# Patient Record
Sex: MALE | Race: WHITE | NOT HISPANIC OR LATINO | Employment: FULL TIME | URBAN - METROPOLITAN AREA
[De-identification: names, ages, dates, MRNs, and addresses within clinical notes are randomized per-mention and may not be internally consistent; named-entity substitution may affect disease eponyms.]

---

## 2017-06-06 ENCOUNTER — TRANSFERRED RECORDS (OUTPATIENT)
Dept: HEALTH INFORMATION MANAGEMENT | Facility: CLINIC | Age: 32
End: 2017-06-06

## 2017-07-31 ENCOUNTER — APPOINTMENT (OUTPATIENT)
Dept: CT IMAGING | Facility: CLINIC | Age: 32
DRG: 316 | End: 2017-07-31
Attending: EMERGENCY MEDICINE
Payer: COMMERCIAL

## 2017-07-31 ENCOUNTER — TRANSFERRED RECORDS (OUTPATIENT)
Dept: HEALTH INFORMATION MANAGEMENT | Facility: CLINIC | Age: 32
End: 2017-07-31

## 2017-07-31 ENCOUNTER — HOSPITAL ENCOUNTER (INPATIENT)
Facility: CLINIC | Age: 32
LOS: 1 days | Discharge: HOME | DRG: 316 | End: 2017-08-01
Attending: EMERGENCY MEDICINE | Admitting: INTERNAL MEDICINE
Payer: COMMERCIAL

## 2017-07-31 ENCOUNTER — APPOINTMENT (OUTPATIENT)
Dept: CARDIOLOGY | Facility: CLINIC | Age: 32
DRG: 316 | End: 2017-07-31
Attending: EMERGENCY MEDICINE
Payer: COMMERCIAL

## 2017-07-31 DIAGNOSIS — I40.9 ACUTE MYOCARDITIS, UNSPECIFIED MYOCARDITIS TYPE: ICD-10-CM

## 2017-07-31 DIAGNOSIS — R07.9 CHEST PAIN, UNSPECIFIED TYPE: ICD-10-CM

## 2017-07-31 LAB
ANION GAP SERPL CALCULATED.3IONS-SCNC: 7 MMOL/L (ref 3–14)
BASOPHILS # BLD AUTO: 0 10E9/L (ref 0–0.2)
BASOPHILS NFR BLD AUTO: 0.4 %
BUN SERPL-MCNC: 9 MG/DL (ref 7–30)
CALCIUM SERPL-MCNC: 8.2 MG/DL (ref 8.5–10.1)
CHLORIDE SERPL-SCNC: 105 MMOL/L (ref 94–109)
CO2 SERPL-SCNC: 28 MMOL/L (ref 20–32)
CREAT SERPL-MCNC: 0.98 MG/DL (ref 0.66–1.25)
CRP SERPL-MCNC: 73.3 MG/L (ref 0–8)
D DIMER PPP FEU-MCNC: 0.5 UG/ML FEU (ref 0–0.5)
DIFFERENTIAL METHOD BLD: NORMAL
EOSINOPHIL # BLD AUTO: 0.1 10E9/L (ref 0–0.7)
EOSINOPHIL NFR BLD AUTO: 0.5 %
ERYTHROCYTE [DISTWIDTH] IN BLOOD BY AUTOMATED COUNT: 13.1 % (ref 10–15)
GFR SERPL CREATININE-BSD FRML MDRD: 88 ML/MIN/1.7M2
GLUCOSE SERPL-MCNC: 91 MG/DL (ref 70–99)
HCT VFR BLD AUTO: 43.6 % (ref 40–53)
HGB BLD-MCNC: 14.7 G/DL (ref 13.3–17.7)
IMM GRANULOCYTES # BLD: 0 10E9/L (ref 0–0.4)
IMM GRANULOCYTES NFR BLD: 0.4 %
LYMPHOCYTES # BLD AUTO: 2 10E9/L (ref 0.8–5.3)
LYMPHOCYTES NFR BLD AUTO: 21.2 %
MCH RBC QN AUTO: 30.8 PG (ref 26.5–33)
MCHC RBC AUTO-ENTMCNC: 33.7 G/DL (ref 31.5–36.5)
MCV RBC AUTO: 91 FL (ref 78–100)
MONOCYTES # BLD AUTO: 1.1 10E9/L (ref 0–1.3)
MONOCYTES NFR BLD AUTO: 11 %
NEUTROPHILS # BLD AUTO: 6.3 10E9/L (ref 1.6–8.3)
NEUTROPHILS NFR BLD AUTO: 66.5 %
NRBC # BLD AUTO: 0 10*3/UL
NRBC BLD AUTO-RTO: 0 /100
PLATELET # BLD AUTO: 198 10E9/L (ref 150–450)
POTASSIUM SERPL-SCNC: 3.7 MMOL/L (ref 3.4–5.3)
RBC # BLD AUTO: 4.77 10E12/L (ref 4.4–5.9)
SODIUM SERPL-SCNC: 140 MMOL/L (ref 133–144)
TROPONIN I SERPL-MCNC: 4.37 UG/L (ref 0–0.04)
TROPONIN I SERPL-MCNC: 5.49 UG/L (ref 0–0.04)
TROPONIN I SERPL-MCNC: 6.53 UG/L (ref 0–0.04)
WBC # BLD AUTO: 9.5 10E9/L (ref 4–11)

## 2017-07-31 PROCEDURE — 25000128 H RX IP 250 OP 636: Performed by: EMERGENCY MEDICINE

## 2017-07-31 PROCEDURE — 93005 ELECTROCARDIOGRAM TRACING: CPT

## 2017-07-31 PROCEDURE — 99222 1ST HOSP IP/OBS MODERATE 55: CPT | Mod: AI | Performed by: INTERNAL MEDICINE

## 2017-07-31 PROCEDURE — 85379 FIBRIN DEGRADATION QUANT: CPT | Performed by: EMERGENCY MEDICINE

## 2017-07-31 PROCEDURE — 86140 C-REACTIVE PROTEIN: CPT | Performed by: EMERGENCY MEDICINE

## 2017-07-31 PROCEDURE — 99222 1ST HOSP IP/OBS MODERATE 55: CPT | Mod: 25 | Performed by: INTERNAL MEDICINE

## 2017-07-31 PROCEDURE — 71260 CT THORAX DX C+: CPT

## 2017-07-31 PROCEDURE — 84484 ASSAY OF TROPONIN QUANT: CPT | Performed by: EMERGENCY MEDICINE

## 2017-07-31 PROCEDURE — 84484 ASSAY OF TROPONIN QUANT: CPT | Performed by: INTERNAL MEDICINE

## 2017-07-31 PROCEDURE — 93005 ELECTROCARDIOGRAM TRACING: CPT | Mod: 76

## 2017-07-31 PROCEDURE — 36415 COLL VENOUS BLD VENIPUNCTURE: CPT | Performed by: INTERNAL MEDICINE

## 2017-07-31 PROCEDURE — 25000132 ZZH RX MED GY IP 250 OP 250 PS 637: Performed by: INTERNAL MEDICINE

## 2017-07-31 PROCEDURE — 25500064 ZZH RX 255 OP 636: Performed by: EMERGENCY MEDICINE

## 2017-07-31 PROCEDURE — 93306 TTE W/DOPPLER COMPLETE: CPT | Mod: 26 | Performed by: INTERNAL MEDICINE

## 2017-07-31 PROCEDURE — 99285 EMERGENCY DEPT VISIT HI MDM: CPT | Mod: 25

## 2017-07-31 PROCEDURE — 85025 COMPLETE CBC W/AUTO DIFF WBC: CPT | Performed by: EMERGENCY MEDICINE

## 2017-07-31 PROCEDURE — 40000264 ECHO COMPLETE WITH LUMASON

## 2017-07-31 PROCEDURE — 12000007 ZZH R&B INTERMEDIATE

## 2017-07-31 PROCEDURE — 25000132 ZZH RX MED GY IP 250 OP 250 PS 637: Performed by: EMERGENCY MEDICINE

## 2017-07-31 PROCEDURE — 80048 BASIC METABOLIC PNL TOTAL CA: CPT | Performed by: EMERGENCY MEDICINE

## 2017-07-31 RX ORDER — ASPIRIN 325 MG
325 TABLET ORAL ONCE
Status: COMPLETED | OUTPATIENT
Start: 2017-07-31 | End: 2017-07-31

## 2017-07-31 RX ORDER — HYDROMORPHONE HYDROCHLORIDE 1 MG/ML
.3-.5 INJECTION, SOLUTION INTRAMUSCULAR; INTRAVENOUS; SUBCUTANEOUS
Status: DISCONTINUED | OUTPATIENT
Start: 2017-07-31 | End: 2017-08-01 | Stop reason: HOSPADM

## 2017-07-31 RX ORDER — AMOXICILLIN 250 MG
1-2 CAPSULE ORAL 2 TIMES DAILY PRN
Status: DISCONTINUED | OUTPATIENT
Start: 2017-07-31 | End: 2017-08-01 | Stop reason: HOSPADM

## 2017-07-31 RX ORDER — ACETAMINOPHEN 325 MG/1
650 TABLET ORAL EVERY 4 HOURS PRN
Status: DISCONTINUED | OUTPATIENT
Start: 2017-07-31 | End: 2017-08-01 | Stop reason: HOSPADM

## 2017-07-31 RX ORDER — TESTOSTERONE CYPIONATE 200 MG/ML
300 INJECTION, SOLUTION INTRAMUSCULAR
COMMUNITY
End: 2020-06-30

## 2017-07-31 RX ORDER — ASPIRIN 325 MG
650 TABLET ORAL 3 TIMES DAILY
Status: DISCONTINUED | OUTPATIENT
Start: 2017-07-31 | End: 2017-08-01 | Stop reason: HOSPADM

## 2017-07-31 RX ORDER — PROCHLORPERAZINE MALEATE 5 MG
5-10 TABLET ORAL EVERY 6 HOURS PRN
Status: DISCONTINUED | OUTPATIENT
Start: 2017-07-31 | End: 2017-08-01 | Stop reason: HOSPADM

## 2017-07-31 RX ORDER — ONDANSETRON 2 MG/ML
4 INJECTION INTRAMUSCULAR; INTRAVENOUS EVERY 6 HOURS PRN
Status: DISCONTINUED | OUTPATIENT
Start: 2017-07-31 | End: 2017-08-01 | Stop reason: HOSPADM

## 2017-07-31 RX ORDER — POLYETHYLENE GLYCOL 3350 17 G/17G
17 POWDER, FOR SOLUTION ORAL DAILY PRN
Status: DISCONTINUED | OUTPATIENT
Start: 2017-07-31 | End: 2017-08-01 | Stop reason: HOSPADM

## 2017-07-31 RX ORDER — ONDANSETRON 4 MG/1
4 TABLET, ORALLY DISINTEGRATING ORAL EVERY 6 HOURS PRN
Status: DISCONTINUED | OUTPATIENT
Start: 2017-07-31 | End: 2017-08-01 | Stop reason: HOSPADM

## 2017-07-31 RX ORDER — PROCHLORPERAZINE 25 MG
25 SUPPOSITORY, RECTAL RECTAL EVERY 12 HOURS PRN
Status: DISCONTINUED | OUTPATIENT
Start: 2017-07-31 | End: 2017-08-01 | Stop reason: HOSPADM

## 2017-07-31 RX ORDER — NALOXONE HYDROCHLORIDE 0.4 MG/ML
.1-.4 INJECTION, SOLUTION INTRAMUSCULAR; INTRAVENOUS; SUBCUTANEOUS
Status: DISCONTINUED | OUTPATIENT
Start: 2017-07-31 | End: 2017-08-01 | Stop reason: HOSPADM

## 2017-07-31 RX ORDER — OXYCODONE HYDROCHLORIDE 5 MG/1
5-10 TABLET ORAL
Status: DISCONTINUED | OUTPATIENT
Start: 2017-07-31 | End: 2017-08-01 | Stop reason: HOSPADM

## 2017-07-31 RX ORDER — IOPAMIDOL 755 MG/ML
500 INJECTION, SOLUTION INTRAVASCULAR ONCE
Status: COMPLETED | OUTPATIENT
Start: 2017-07-31 | End: 2017-07-31

## 2017-07-31 RX ADMIN — ASPIRIN 325 MG ORAL TABLET 650 MG: 325 PILL ORAL at 21:00

## 2017-07-31 RX ADMIN — RANITIDINE HYDROCHLORIDE 150 MG: 150 TABLET, FILM COATED ORAL at 20:59

## 2017-07-31 RX ADMIN — SULFUR HEXAFLUORIDE 2 ML: KIT at 16:05

## 2017-07-31 RX ADMIN — SODIUM CHLORIDE 90 ML: 9 INJECTION, SOLUTION INTRAVENOUS at 15:17

## 2017-07-31 RX ADMIN — ASPIRIN 325 MG ORAL TABLET 650 MG: 325 PILL ORAL at 17:35

## 2017-07-31 RX ADMIN — ASPIRIN 325 MG ORAL TABLET 325 MG: 325 PILL ORAL at 16:07

## 2017-07-31 RX ADMIN — IOPAMIDOL 79 ML: 755 INJECTION, SOLUTION INTRAVENOUS at 15:16

## 2017-07-31 ASSESSMENT — ACTIVITIES OF DAILY LIVING (ADL)
COGNITION: 0 - NO COGNITION ISSUES REPORTED
DRESS: 0-->INDEPENDENT
RETIRED_COMMUNICATION: 0-->UNDERSTANDS/COMMUNICATES WITHOUT DIFFICULTY
TRANSFERRING: 0-->INDEPENDENT
TOILETING: 0-->INDEPENDENT
BATHING: 0-->INDEPENDENT
FALL_HISTORY_WITHIN_LAST_SIX_MONTHS: NO
RETIRED_EATING: 0-->INDEPENDENT
AMBULATION: 0-->INDEPENDENT
SWALLOWING: 0-->SWALLOWS FOODS/LIQUIDS WITHOUT DIFFICULTY

## 2017-07-31 ASSESSMENT — ENCOUNTER SYMPTOMS
DIAPHORESIS: 1
CHILLS: 1
NAUSEA: 1
PALPITATIONS: 0
VOMITING: 0
LIGHT-HEADEDNESS: 0

## 2017-07-31 NOTE — IP AVS SNAPSHOT
[place] [address] [facility]    [address]    [facility] [address]    Phone:  [phone]    Fax:  [phone]                                       After Visit Summary   7/31/2017    Nick Rose    MRN: [id number]           After Visit Summary Signature Page     I have received my discharge instructions, and my questions have been answered. I have discussed any challenges I see with this plan with the nurse or doctor.    ..........................................................................................................................................  Patient/Patient Representative Signature      ..........................................................................................................................................  Patient Representative Print Name and Relationship to Patient    ..................................................               ................................................  Date                                            Time    ..........................................................................................................................................  Reviewed by Signature/Title    ...................................................              ..............................................  Date                                                            Time

## 2017-07-31 NOTE — PHARMACY-ADMISSION MEDICATION HISTORY
Admission medication history interview status for this patient is complete. See [facility] admission navigator for allergy information, prior to admission medications and immunization status.     Medication history interview source(s):Patient  Medication history resources (including written lists, pill bottles, clinic record):Care-Everywhere    Changes made to PTA medication list:  Added: all    Medication reconciliation/reorder completed by provider prior to medication history? No    For patients on insulin therapy: No      Prior to Admission medications    Medication Sig Last Dose Taking? Auth Provider   testosterone cypionate (DEPOTESTOTERONE) 200 MG/ML injection Inject 300 mg into the muscle Per pt, every 10 days due 8/5/17 Yes Unknown, Entered By History

## 2017-07-31 NOTE — IP AVS SNAPSHOT
MRN:[id number]                      After Visit Summary   7/31/2017    Nick Rose    MRN: [id number]           Thank you!     Thank you for choosing [facility] for your care. Our goal is always to provide you with excellent care. Hearing back from our patients is one way we can continue to improve our services. Please take a few minutes to complete the written survey that you may receive in the mail after you visit. If you would like to speak to someone directly about your visit please contact Patient Relations at [phone]. Thank you!          Patient Information     Date Of Birth          1985        Designated Caregiver       Most Recent Value    Caregiver    Will someone help with your care after discharge? no      About your hospital stay     You were admitted on:  July 31, 2017 You last received care in the:  [place] [address] [facility]    You were discharged on:  August 1, 2017        Reason for your hospital stay       You were hospitalized for myocarditis (inflammation of the heart muscle).                  Who to Call     For medical emergencies, please call 911.  For non-urgent questions about your medical care, please call your primary care provider or clinic, [phone]          Attending Provider     Provider Specialty    Jasson Conway DO Emergency Medicine    Renee Back MD Internal Medicine       Primary Care Provider Office Phone # Fax #    Hugo Vegas [phone]      After Care Instructions     Activity       Your activity upon discharge: activity as tolerated            Diet       Follow this diet upon discharge: Orders Placed This Encounter      Combination Diet Regular Diet Adult                  Follow-up Appointments     Follow-up and recommended labs and tests        Follow up with Cardiology as scheduled.                  Your next 10 appointments already scheduled     Aug 15, 2017  1:00 PM CDT    Ech Complete with [id number]   [facility] ([facility])    [address]  [facility] [address]   [phone]           1. Please bring or wear a comfortable two-piece outfit. 2. You may eat, drink and take your normal medicines. 3. For any questions that cannot be answered, please contact the ordering physician ***Please check-in at the [place] Registration Office located in [address] in the [facility] building. When you are finished registering, please go to [address] and have a seat. The technician will call your name for the test.            Aug 16, 2017 11:30 AM CDT   LAB with RU LAB   [facility] ([facility])    [address]  [facility] [address] [phone]           Patient must bring picture ID. Patient should be prepared to give a urine specimen  Please do not eat 10-12 hours before your appointment if you are coming in fasting for labs on lipids, cholesterol, or glucose (sugar). Pregnant women should follow their Care Team instructions. Water with medications is okay. Do not drink coffee or other fluids. If you have concerns about taking  your medications, please ask at office or if scheduling via MiTio, send a message by clicking on Secure Messaging, Message Your Care Team.            Aug 16, 2017 12:30 PM CDT   Return Discharge with DANICA Mendoza CNP   [facility] ([facility])    [address]  [facility] [address]   [phone]            Aug 29, 2017  1:30 PM CDT   LAB with RU LAB   [facility] ([facility])    [address] [place] [address] [address]  [facility] [address]   [phone]           Patient must bring picture ID. Patient should be prepared to give a urine specimen  Please do not eat 10-12 hours before your appointment if you  are coming in fasting for labs on lipids, cholesterol, or glucose (sugar). Pregnant women should follow their Care Team instructions. Water with medications is okay. Do not drink coffee or other fluids. If you have concerns about taking  your medications, please ask at office or if scheduling via eTukTukhart, send a message by clicking on Secure Messaging, Message Your Care Team.            Aug 29, 2017  2:30 PM CDT   Return Discharge with DANICA Mendoza CNP   [facility] [place] ([facility])    [address]  [facility] [address] [phone]              Additional Services     Follow-Up with Cardiac Advanced Practice Provider           Follow-Up with Cardiac Advanced Practice Provider           Follow-Up with Cardiologist                 Future tests that were ordered for you     CRP inflammation           Echocardiogram       The supervising Cardiologist may change the type of echocardiogram requested to answer a specific clinical question based on existing protocols in the Echocardiography laboratory.    Use of contrast is at the discretion of the supervising Cardiologist.            CRP inflammation                 Further instructions from your care team       1. Follow up with Cardiology in clinic as scheduled.  2. Take the high dose aspirin three times daily.  3. Take the Zantac twice daily to prevent an ulcer of the stomach from the high dose aspirin.  4. If you develop chest pain, shortness of breath or swelling in your legs you need to be seen again as these could be signs that you have developed heart failure.    Pending Results     No orders found for last 3 day(s).            Statement of Approval     Ordered          08/01/17 1237  I have reviewed and agree with all the recommendations and orders detailed in this document.  EFFECTIVE NOW     Approved and electronically signed by:  Renee Back MD             Admission Information      "Date & Time Provider Department Dept. Phone    2017 Renee Back MD [place] [address] [facility] [phone]      Your Vitals Were     Blood Pressure Pulse Temperature Respirations Height Weight    110/70 72 96.8  F (36  C) (Oral) 16 1.905 m (6' 3\") 111.1 kg (245 lb)    Pulse Oximetry BMI (Body Mass Index)                98% 30.62 kg/m2          Footbalistic Information     Footbalistic lets you send messages to your doctor, view your test results, renew your prescriptions, schedule appointments and more. To sign up, go to [email or web address] . Click on \"Log in\" on the left side of the screen, which will take you to the Welcome page. Then click on \"Sign up Now\" on the right side of the page.     You will be asked to enter the access code listed below, as well as some personal information. Please follow the directions to create your username and password.     Your access code is: [email or web address]  Expires: 10/30/2017 12:48 PM     Your access code will  in 90 days. If you need help or a new code, please call your [place] clinic or [phone].        Care EveryWhere ID     This is your Care EveryWhere ID. This could be used by other organizations to access your [place] medical records  [id number]        Equal Access to Services     LAURIE BAZZI AH: Hadii aury Powell, waaxda luqadaha, qaybta kaalmada lisa, noemy stone . So [facility] [phone].    ATENCIÓN: Si habla español, tiene a grant disposición servicios gratuitos de asistencia lingüística. Corina al [phone].    We comply with applicable federal civil rights laws and [place] laws. We do not discriminate on the basis of race, color, national origin, age, disability sex, sexual orientation or gender identity.               Review of your medicines      START taking        Dose / Directions    aspirin 325 MG tablet   Used for:  Acute myocarditis, unspecified myocarditis type        Dose:  650 mg   Take 2 " tablets (650 mg) by mouth 3 times daily   Quantity:  90 tablet   Refills:  0       ranitidine 150 MG tablet   Commonly known as:  ZANTAC   Used for:  Acute myocarditis, unspecified myocarditis type        Dose:  150 mg   Take 1 tablet (150 mg) by mouth 2 times daily   Quantity:  60 tablet   Refills:  0         CONTINUE these medicines which have NOT CHANGED        Dose / Directions    testosterone cypionate 200 MG/ML injection   Commonly known as:  DEPOTESTOTERONE   Notes to Patient:  Next dose based on own personal schedule        Dose:  300 mg   Inject 300 mg into the muscle Per pt, every 10 days   Refills:  0            Where to get your medicines      These medications were sent to [id number] - [place], [place] - [address]  [address] [facility] [address]     Phone:  [phone]     aspirin 325 MG tablet    ranitidine 150 MG tablet                Protect others around you: Learn how to safely use, store and throw away your medicines at www.disposemymeds.org.             Medication List: This is a list of all your medications and when to take them. Check marks below indicate your daily home schedule. Keep this list as a reference.      Medications           Morning Afternoon Evening Bedtime As Needed    aspirin 325 MG tablet   Take 2 tablets (650 mg) by mouth 3 times daily   Last time this was given:  650 mg on 8/1/2017  9:13 AM                4:00 pm                   ranitidine 150 MG tablet   Commonly known as:  ZANTAC   Take 1 tablet (150 mg) by mouth 2 times daily   Last time this was given:  150 mg on 8/1/2017  9:13 AM                 9:00 pm               testosterone cypionate 200 MG/ML injection   Commonly known as:  DEPOTESTOTERONE   Inject 300 mg into the muscle Per pt, every 10 days   Notes to Patient:  Next dose based on own personal schedule                                          More Information        Pericarditis    Pericarditis is inflammation of the pericardium, the  thin sac (membrane) that surrounds the heart.  The pericardium holds the heart in place and helps it work properly. There is a small amount of fluid between the inner and outer layers of the pericardium. This fluid keeps the layers from rubbing as the heart moves to pump blood. Pericarditis may last for 2 to 6 weeks.  Home care  Follow these guidelines when caring for yourself at home:    It s important to rest until you feel better. Don t do any strenuous activity during this time.    You may use ibuprofen or naproxen to control pain, unless another medicine was prescribed. If you have chronic liver or kidney disease, talk with your healthcare provider before using these medicines. Also talk with your provider if you ve had a stomach ulcer or gastrointestinal bleeding. Acetaminophen may not help this type of pain as much as ibuprofen or naproxen.  Follow-up care  Follow up with your healthcare provider, or as advised. Also call your provider if your symptoms don t get better in 1 week, or if they last more than 2 weeks.  When to seek medical advice  Call your healthcare provider right away if any of these occur:    A change in the type of pain. This means if it feels different, becomes more severe, lasts longer, or begins to spread into your shoulder, arm, neck, jaw, or back.    Shortness of breath or more pain with breathing    Weakness, dizziness, or fainting    Cough with dark-colored phlegm (sputum) or blood    Fever of 100.4 F (38 C) or higher, or as directed by your healthcare provider    Swelling in a leg    Rapid pulse rate that does not go away with rest  Date Last Reviewed: 6/1/2016 2000-2017 [employer] [address] [place] [address], [place] [address]. All rights reserved. This information is not intended as a substitute for professional medical care. Always follow your healthcare professional's instructions.

## 2017-07-31 NOTE — PROGRESS NOTES
DICTATED [id number]   33 yo male with myopericarditis.  EF 50% Trop 5  1. Follow trops, monitor on TELE min 24hrs.  If any hemodynamic change do not hesitate to repeat limited echo  2. High dose ASA 650mg TID, PUD prophylaxis with Zantac, treat until CRP normalizes  3. REST- no overseas travel for min 2-4 weeks, talked to him about discussing with boss as he is scheduled to go back to [place] in 4 weeks.

## 2017-07-31 NOTE — PLAN OF CARE
Problem: Goal Outcome Summary  Goal: Goal Outcome Summary  Outcome: No Change  AO, VSS. Denies pain. Tele SR.

## 2017-07-31 NOTE — ED PROVIDER NOTES
"  History     Chief Complaint:  Chest Pain      The history is provided by the patient.      Nick Rose is a 32 year old male who presents with chest pain. Patient reports being woken from sleep at 0200 this morning at which time he was \"violently shivering\". He was able to fall back to sleep but woke up feeling clammy and generally unwell with chest pain associated by nausea. He took Advil cold and sinus at 0930 this morning without relief of symptoms prompting visit to the [facility] who then referred him to the emergency department for evaluation of PE/pericarditis after normal chest XR. On arrival in the emergency department patient rated his pain at 5/10 in severity, and it was aggravated with respiration this morning. Since leaving the [facility] center pain has improved, he notes moving around helps his symptoms. He denies leg swelling, lightheadedness, palpitations, vomiting, or other complaint.     CARDIAC RISK FACTORS:  Sex:    M  Hypertension:   Neg  Hyperlipidemia:  Neg  Diabetes:   Neg  Family History:  Father - MI in 60's    PE/DVT RISK FACTORS:  Sex:    M  Hormones:   Neg  Travel:   Flights from [place] last Thursday 7/27, longest 16 hours  Personal history:  Neg  Family history:  Neg     Allergies:  No known drug allergies      Medications:    The patient is not currently taking any prescribed medications.     Past Medical History:    The patient does not have any past pertinent medical history.     Past Surgical History:    History reviewed. No pertinent surgical history.     Family History:    History reviewed. No pertinent family history.      Social History:  Presents alone   Works in [employer] in [place]  PCP: Hugo Vegas         Review of Systems   Constitutional: Positive for chills and diaphoresis.   Cardiovascular: Positive for chest pain. Negative for palpitations and leg swelling.   Gastrointestinal: Positive for nausea. Negative for vomiting.   Neurological: Negative for " "light-headedness.   All other systems reviewed and are negative.      Physical Exam     Patient Vitals for the past 24 hrs:   BP Temp Temp src Pulse Heart Rate Resp SpO2 Height Weight   07/31/17 1500 114/80 - - - 58 - 96 % - -   07/31/17 1445 116/71 - - - - - 97 % - -   07/31/17 1430 126/61 - - - 68 - 96 % - -   07/31/17 1415 113/73 - - - - - 96 % - -   07/31/17 1400 120/82 - - - - - 96 % - -   07/31/17 1345 142/75 - - - - - - - -   07/31/17 1330 134/85 98  F (36.7  C) Oral 76 - 18 99 % 1.905 m (6' 3\") 111.1 kg (245 lb)      Physical Exam   Constitutional: Patient appears well-developed and well-nourished. There is no acute distress.   Head: No external signs of trauma noted.  Neck: No JVD noted  Eyes: Pupils are equal, round, and reactive to light.   Cardiovascular: Normal rate, regular rhythm and normal heart sounds.  Exam reveals no gallop and no friction rub.  No murmur heard. Normal peripheral pulses.  Pulmonary/Chest: Effort normal and breath sounds normal. No respiratory distress. Patient has no wheezes. Patient has no rales.   Abdominal: Soft. There is no tenderness.   Extremities: No edema noted  Neurological: Patient is alert and oriented to person, place, and time.   Skin: Skin is warm and dry. There is no diaphoresis noted.       Emergency Department Course   ECG (13:35:28):  Rate 67 bpm. VT interval 140. QRS duration 98. QT/QTc 392/414. P-R-T axes -11 33 -3. NSR. ST elevation, consider early repolarization, pericarditis, or injury. Abnormal ECG. Agree with computer. Interpreted at 1404 by Jasson Conway DO.     ECG (13:49:01):  Rate 72 bpm. VT interval 138. QRS duration 90. QT/QTc 386/422. P-R-T axes -2 35 4. NSR. Normal ECG. Agree with computer. Interpreted at 1404 by Jasson Conway DO.     Imaging:  Radiographic findings were communicated with the patient who voiced understanding of the findings.    CT Chest, IV contrast only:  IMPRESSION: No CT evidence of pulmonary embolism. The " lungs appear clear.    LD MILLS MD    Imaging independently reviewed and agree with radiologist interpretation.      Laboratory:  CBC: WNL (WBC 9.5, HGB 14.7, )   BMP: Calcium 8.2 (L) ow WNL (Creatinine 0.98)   D-dimer: 0.5  1351: Troponin: 5.490 (HH)  CRP inflammation: 73.3 (H)    Interventions:  Aspirin 325 mg PO    Emergency Department Course:  Past medical records, nursing notes, and vitals reviewed.  1349: I performed an exam of the patient and obtained history, as documented above.  IV inserted and blood drawn.   Above interventions provided.   The patient was sent for a CT while in the emergency department, findings above.   1510: Consulted Dr. Miles of cardiology.   I personally reviewed the laboratory results with the Patient and answered all related questions prior to admission.    Findings and plan explained to the Patient who consents to admission.     1525: Discussed the patient with Dr. Back, who will admit the patient to an inpatient bed for further monitoring, evaluation, and treatment.      Impression & Plan    Medical Decision Making:  Nick Rose is a 32 year old male presenting to the ER for evaluation of chest pain. Please see HPI for specifics. By the time of my evaluation the patient was chest pain free. His lab work did demonstrate an elevated d-dimer, elevated CRP, and elevated troponin. The question of myocarditis was raised and I discussed the case with cardiology who recommends an echocardiogram. We will bring the patient in for further monitoring and care of these conditions.     Diagnosis:    ICD-10-CM   1. Chest pain, unspecified type R07.9   2. Acute myocarditis, unspecified myocarditis type I40.9       Disposition:  Admitted to hospitalist service.     Ruddy Farias  7/31/2017   [facility] EMERGENCY DEPARTMENT  I, Ruddy Farias, am serving as a scribe at 1:49 PM on 7/31/2017 to document services personally performed by Jasson Conway DO  based on my observations and the provider's statements to me.       Jasson Conway, DO  07/31/17 1849

## 2017-07-31 NOTE — H&P
[facility]  Hospitalist Admission Note  Name: Nick Rose    MRN: [id number]  YOB: 1985    Age: 32 year old  Date of admission: 7/31/2017  Primary care provider: Hugo Vegas    Chief Complaint:  Chest Pain    Assessment and Plan:     Nick Rose is a 32 year old male with no significant past medical history who was admitted 07/31/17 with chest pain and was found to have elevated troponin and CRP concerning for myocarditis.    1. Chest Pain: Developed chills, myalgias and diaphoresis last night/early this morning.  He then developed left sided chest pain which did not radiate.  It was worsened with deep inspiration but did improve with ambulation.  He was found to have mild ST elevation (possible repolarization) on EKG without TWI or PA depression.  His troponin was elevated to 5.49 and CRP was elevated to 73.3.  He had not been feeling ill prior to last night.  Case discussed with cardiology by ER physician and recommended TTE but no heparin at this time as this seems to be myocarditis and not ACS.  He did have a CTPA to rule out PE (did have recent long plane travel) which was negative for PE.  - STAT TTE  - Serial troponin  - No heparin for now  - Consult Cardiology  - Avoid NSAIDS  - Tylenol or PRN Oxycodone or PRN IV Dilaudid for severe pain    DVT Prophylaxis: Pneumatic Compression Devices  Code Status: Full Code  Discharge Dispo: Admit to inpatient status  Estimated Disch Date / # of Days until Disch: Expect 2 midnight stay      History of Present Illness:  Nick Rose is a 32 year old male with no significant past medical history who was admitted 07/31/17 with chest pain and was found to have elevated troponin and CRP concerning for myocarditis.  History was obtained through patient interview, chart review and discussion with Dr. Conway in the ER.    The patient states that overnight he had symptoms that made him think he was coming down with the flu.  He  had chills, felt clammy and had diffuse myalgias.  This morning he noticed that he had left sided chest pain.  It was worse with deep breathing and actually improved when he got up and walked around.  At it's worst it was 5/10.  The pain did not radiate and he did not have SOB, cough, dizziness, lightheadedness, diarrhea, constipation, HA, urinary changes.  [facility] and was told that he should come to the ER for further evaluation.    He has felt slightly tired for the past two days but he had been working in [place] longer hours (works in the [employer]) so attributed the fatigue to his work schedule.  He also returned from [place] on 7/27 (flight was 16 hours).  He has not recently felt sick.  Has never had chest pain like this in the past.    He did take an Advil Cold and Sinus this morning because he thought he was coming down with something.  He currently has very mild left sided chest pain but it is not bothersome.     Past Medical History:  Past Medical History:   Diagnosis Date     NO ACTIVE PROBLEMS      Past Surgical History:  Past Surgical History:   Procedure Laterality Date     ELBOW SURGERY      arthroscopic surgery     Social History:  Social History   Substance Use Topics     Smoking status: Not on file     Smokeless tobacco: Not on file     Alcohol use Not on file     Social History     Social History Narrative   He is a non smoker.  He drinks alcohol socially.  No drug use.  He works in the [employer].    Family History:  Family History   Problem Relation Age of Onset     Coronary Artery Disease Father      In his 60s     Allergies:  No Known Allergies  Medications:  No PTA medications.  Did take an Advil Cold and Sinus this morning.    Review of Systems:  A Comprehensive greater than 10 system review of systems was carried out.  Pertinent positives and negatives are noted above.  Otherwise negative for contributory information.     Physical Exam:  Blood pressure 114/80, pulse 76,  "temperature 98  F (36.7  C), temperature source Oral, resp. rate 18, height 1.905 m (6' 3\"), weight 111.1 kg (245 lb), SpO2 96 %.  Wt Readings from Last 1 Encounters:   07/31/17 111.1 kg (245 lb)     Exam:   General: Alert, awake, no acute distress.  HEENT: NC/AT, eyes anicteric and without injection, EOMI, face symmetric.  Dentition WNL, MMM.  Cardiac: RRR, normal S1, S2.  No murmurs/g/r.  No LE edema  Pulmonary: Normal chest rise, normal work of breathing.  Lungs CTAB  Abdomen: soft, non-tender, non-distended.  Normoactive BS.  No guarding or rebound tenderness.  Extremities: no deformities.  Warm, well perfused.  Skin: no rashes or lesions noted.  Warm and Dry.  Neuro: No focal deficits noted.  Speech clear.  Coordination and strength grossly normal.  Psych: Appropriate affect. Alert and oriented x3    Data Reviewed Today:  EKG:  NSR, mild ST elevation in V1, aVL, V5, no MN depression.  Imaging:  Results for orders placed or performed during the hospital encounter of 07/31/17   CT Chest Pulmonary Embolism w Contrast    Narrative    CT CHEST PULMONARY EMBOLISM WITH CONTRAST  7/31/2017 3:23 PM     HISTORY: Chest pain.    CONTRAST DOSE: 79mL Isovue-370.    Radiation dose for this scan was reduced using automated exposure  control, adjustment of the mA and/or kV according to patient size, or  iterative reconstruction technique.    FINDINGS: There is a good contrast bolus within the pulmonary  arteries. No pulmonary arterial filling defect is demonstrated to  indicate pulmonary embolism. Contrast bolus within the aorta is less  optimal. There is no evidence of aortic dissection. The mediastinum  and johana appear within normal limits. The lungs appear clear. No  pleural effusion or pneumothorax.      Impression    IMPRESSION: No CT evidence of pulmonary embolism. The lungs appear  clear.    LD MILLS MD     Labs:    Recent Labs  Lab 07/31/17  1351   WBC 9.5   HGB 14.7   HCT 43.6   MCV 91          Recent " Labs  Lab 07/31/17  1351      POTASSIUM 3.7   CHLORIDE 105   CO2 28   ANIONGAP 7   GLC 91   BUN 9   CR 0.98   GFRESTIMATED 88   GFRESTBLACK >90African American GFR Calc   ZAYDA 8.2*       Recent Labs  Lab 07/31/17  1351   DD 0.5       Recent Labs  Lab 07/31/17  1351   CRP 73.3*       Recent Labs  Lab 07/31/17  1351   TROPI 5.490*       Renee Back MD  Hospitalist  [facility]

## 2017-07-31 NOTE — ED NOTES
[facility]  ED Nurse Handoff Report    Nick Rose is a 32 year old male   ED Chief complaint: Chest Pain  . ED Diagnosis:   Final diagnoses:   Chest pain, unspecified type   Acute myocarditis, unspecified myocarditis type     Allergies: No Known Allergies    Code Status: Full Code  Activity level - Baseline/Home:  Independent. Activity Level - Current:   Independent.SBALift room needed: No. Bariatric: No   Needed: No   Isolation: No. Infection: Not Applicable.     Vital Signs:   Vitals:    07/31/17 1500 07/31/17 1530 07/31/17 1545 07/31/17 1605   BP: 114/80 127/88 100/63 92/51   Pulse:       Resp:       Temp:       TempSrc:       SpO2: 96% 98% 98% 97%   Weight:       Height:           Cardiac Rhythm:  ,      Pain level: 0-10 Pain Scale: 5  Patient confused: No. Patient Falls Risk: Yes.   Elimination Status: Has voided   Patient Report - Initial Complaint: CP. Focused Assessment: CP, denies precipit. Factors. Pain increases with deep breathing. LS Clear. No recent illness. Recent travel back from [place]. A/Ox4, ABC in tact. Ambulates steady gait, independent.  Trop elevated, Ddimer elevated.  Tests Performed: EKG, Labs, Imaging. Abnormal Results:   Labs Ordered and Resulted from Time of ED Arrival Up to the Time of Departure from the ED   BASIC METABOLIC PANEL - Abnormal; Notable for the following:        Result Value    Calcium 8.2 (*)     All other components within normal limits   TROPONIN I - Abnormal; Notable for the following:     Troponin I ES 5.490 (*)     All other components within normal limits   CRP INFLAMMATION - Abnormal; Notable for the following:     CRP Inflammation 73.3 (*)     All other components within normal limits   CBC WITH PLATELETS DIFFERENTIAL   D DIMER QUANTITATIVE   PERIPHERAL IV CATHETER     .   Treatments provided: monitoring, ASA  Family Comments: none  OBS brochure/video discussed/provided to patient:  No  ED Medications:   Medications   sodium chloride  (PF) 0.9% PF flush 3 mL (not administered)   sodium chloride (PF) 0.9% PF flush 3 mL (not administered)   iopamidol (ISOVUE-370) solution 500 mL (79 mLs Intravenous Given 7/31/17 1516)   0.9% sodium chloride BOLUS (0 mLs Intravenous Stopped 7/31/17 1520)   aspirin tablet 325 mg (325 mg Oral Given 7/31/17 1607)   sulfur hexafluoride microsphere (LUMASON) 60.7-25 MG injection 2 mL (2 mLs Intravenous Given 7/31/17 1605)   sodium chloride (PF) 0.9% PF flush 10 mL (10 mLs Intravenous Given 7/31/17 1605)     Drips infusing:  No  For the majority of the shift this patient was Green. Interventions performed were monitoring.     Severe Sepsis OR Septic Shock Diagnosis Present: No      ED Nurse Name/Phone Number: Esme Dotson,   4:10 PM  RECEIVING UNIT ED HANDOFF REVIEW    Above ED Nurse Handoff Report was reviewed: Yes  Reviewed by: Alyson Artis on July 31, 2017 at 4:17 PM

## 2017-07-31 NOTE — ED NOTES
Chest pain that worsens with deep breathing that began about 2 am.  Sent here by [facility] to be evaluated for a possible lung infection/ pericarditis.  Took Advil cold and Sinus about 0930 today. Patient alert and oriented x3.  Airway, breathing and circulation intact.

## 2017-08-01 VITALS
SYSTOLIC BLOOD PRESSURE: 110 MMHG | TEMPERATURE: 96.8 F | DIASTOLIC BLOOD PRESSURE: 70 MMHG | RESPIRATION RATE: 16 BRPM | WEIGHT: 245 LBS | BODY MASS INDEX: 30.46 KG/M2 | HEART RATE: 72 BPM | OXYGEN SATURATION: 98 % | HEIGHT: 75 IN

## 2017-08-01 LAB
ALBUMIN SERPL-MCNC: 3 G/DL (ref 3.4–5)
ALP SERPL-CCNC: 65 U/L (ref 40–150)
ALT SERPL W P-5'-P-CCNC: 27 U/L (ref 0–70)
ANION GAP SERPL CALCULATED.3IONS-SCNC: 4 MMOL/L (ref 3–14)
AST SERPL W P-5'-P-CCNC: 36 U/L (ref 0–45)
BILIRUB SERPL-MCNC: 0.3 MG/DL (ref 0.2–1.3)
BUN SERPL-MCNC: 14 MG/DL (ref 7–30)
CALCIUM SERPL-MCNC: 8.1 MG/DL (ref 8.5–10.1)
CHLORIDE SERPL-SCNC: 107 MMOL/L (ref 94–109)
CO2 SERPL-SCNC: 29 MMOL/L (ref 20–32)
CREAT SERPL-MCNC: 0.98 MG/DL (ref 0.66–1.25)
CRP SERPL-MCNC: 66.7 MG/L (ref 0–8)
ERYTHROCYTE [DISTWIDTH] IN BLOOD BY AUTOMATED COUNT: 13 % (ref 10–15)
GFR SERPL CREATININE-BSD FRML MDRD: 88 ML/MIN/1.7M2
GLUCOSE SERPL-MCNC: 117 MG/DL (ref 70–99)
HCT VFR BLD AUTO: 41.3 % (ref 40–53)
HGB BLD-MCNC: 14 G/DL (ref 13.3–17.7)
INTERPRETATION ECG - MUSE: NORMAL
MCH RBC QN AUTO: 31.2 PG (ref 26.5–33)
MCHC RBC AUTO-ENTMCNC: 33.9 G/DL (ref 31.5–36.5)
MCV RBC AUTO: 92 FL (ref 78–100)
NT-PROBNP SERPL-MCNC: 610 PG/ML (ref 0–450)
PLATELET # BLD AUTO: 166 10E9/L (ref 150–450)
POTASSIUM SERPL-SCNC: 4.2 MMOL/L (ref 3.4–5.3)
PROT SERPL-MCNC: 6.5 G/DL (ref 6.8–8.8)
RBC # BLD AUTO: 4.49 10E12/L (ref 4.4–5.9)
SODIUM SERPL-SCNC: 140 MMOL/L (ref 133–144)
TROPONIN I SERPL-MCNC: 3.12 UG/L (ref 0–0.04)
WBC # BLD AUTO: 7.5 10E9/L (ref 4–11)

## 2017-08-01 PROCEDURE — 80053 COMPREHEN METABOLIC PANEL: CPT | Performed by: INTERNAL MEDICINE

## 2017-08-01 PROCEDURE — 85027 COMPLETE CBC AUTOMATED: CPT | Performed by: INTERNAL MEDICINE

## 2017-08-01 PROCEDURE — 36415 COLL VENOUS BLD VENIPUNCTURE: CPT | Performed by: INTERNAL MEDICINE

## 2017-08-01 PROCEDURE — 25000132 ZZH RX MED GY IP 250 OP 250 PS 637: Performed by: INTERNAL MEDICINE

## 2017-08-01 PROCEDURE — 86140 C-REACTIVE PROTEIN: CPT | Performed by: INTERNAL MEDICINE

## 2017-08-01 PROCEDURE — 83880 ASSAY OF NATRIURETIC PEPTIDE: CPT | Performed by: INTERNAL MEDICINE

## 2017-08-01 PROCEDURE — 99231 SBSQ HOSP IP/OBS SF/LOW 25: CPT | Mod: 25 | Performed by: INTERNAL MEDICINE

## 2017-08-01 PROCEDURE — 99239 HOSP IP/OBS DSCHRG MGMT >30: CPT | Performed by: INTERNAL MEDICINE

## 2017-08-01 PROCEDURE — 84484 ASSAY OF TROPONIN QUANT: CPT | Performed by: INTERNAL MEDICINE

## 2017-08-01 RX ORDER — ASPIRIN 325 MG
650 TABLET ORAL 3 TIMES DAILY
Qty: 90 TABLET | Refills: 0 | Status: SHIPPED | OUTPATIENT
Start: 2017-08-01 | End: 2017-08-16

## 2017-08-01 RX ADMIN — RANITIDINE HYDROCHLORIDE 150 MG: 150 TABLET, FILM COATED ORAL at 09:13

## 2017-08-01 RX ADMIN — ASPIRIN 325 MG ORAL TABLET 650 MG: 325 PILL ORAL at 09:13

## 2017-08-01 NOTE — DISCHARGE SUMMARY
"[facility]  Discharge Summary  Name: Nick Rose    MRN: [id number]  YOB: 1985    Age: 32 year old  Date of Discharge:  8/1/2017  Date of Admission: 7/31/2017  Primary Care Provider: Hugo Vegas  Discharge Physician:  Renee Back MD  Discharging Service:  Hospitalist      Discharge Diagnoses:  1. Myopericarditis     Hospital Course:  Nick Rose is a 32 year old male with no significant past medical history who was admitted 7/31/17 with acute onset chest pain and was found to have elevated troponin and CRP ultimately due to myopericarditis.  For complete details of admission please see H&P dated 7/31/17.    The patient had non specific ST abnormalities on his EKG.  Troponin was elevated and did peak at 6.528 but subsequently down trended.  He had a TTE which showed an EF of 50% with global hypokinesia and borderline reduced RV function.  He was seen by Cardiology this admission who recommended high dose ASA.  He will need to remain on this (as well as Ranitidine for prophylaxis) until the CRP normalizes.  He has follow up with Cardiology in 2 weeks with CPR and repeat TTE as well as follow up in one month with CRP.  He was advised that he cannot travel for one month.  He is very concerned about his job and stated he was going to go back to [place] for work at the end of the month.  Discussed the risks of doing this including death and he stated he will see how he is doing at his follow up appointment.  On day of discharge he had no CP.     Discharge Disposition:  Discharged to home     Allergies:  No Known Allergies     Condition on Discharge:  Discharge condition: Stable   Discharge vitals: Blood pressure 110/70, pulse 72, temperature 96.8  F (36  C), temperature source Oral, resp. rate 16, height 1.905 m (6' 3\"), weight 111.1 kg (245 lb), SpO2 98 %.   Code status on discharge: Full Code     History of Illness:  See detailed admission note for full details.    Physical " "Exam:  Blood pressure 110/70, pulse 72, temperature 96.8  F (36  C), temperature source Oral, resp. rate 16, height 1.905 m (6' 3\"), weight 111.1 kg (245 lb), SpO2 98 %.  Wt Readings from Last 1 Encounters:   07/31/17 111.1 kg (245 lb)     General: Alert, awake, no acute distress.  HEENT: Normocephalic, atraumatic, eyes anicteric and without scleral injection, EOMI, MMM.  Cardiac: RRR, normal S1, S2.  No m/g/r. No LE edema.  Pulmonary: Normal chest rise, normal work of breathing.  Lungs CTAB  Abdomen: soft, non-tender, non-distended.  Normoactive BS.  No guarding or rebound tenderness.  Extremities: no deformities.  Warm, well perfused.  Skin: no rashes or lesions noted.  Warm and Dry.  Neuro: No focal deficits noted.  Speech clear.  Coordination and strength grossly normal.  Psych: Appropriate affect. Alert and oriented x3    Procedures other than Imaging:  TTE     Imaging:  Results for orders placed or performed during the hospital encounter of 07/31/17   CT Chest Pulmonary Embolism w Contrast    Narrative    CT CHEST PULMONARY EMBOLISM WITH CONTRAST  7/31/2017 3:23 PM     HISTORY: Chest pain.    CONTRAST DOSE: 79mL Isovue-370.    Radiation dose for this scan was reduced using automated exposure  control, adjustment of the mA and/or kV according to patient size, or  iterative reconstruction technique.    FINDINGS: There is a good contrast bolus within the pulmonary  arteries. No pulmonary arterial filling defect is demonstrated to  indicate pulmonary embolism. Contrast bolus within the aorta is less  optimal. There is no evidence of aortic dissection. The mediastinum  and johana appear within normal limits. The lungs appear clear. No  pleural effusion or pneumothorax.      Impression    IMPRESSION: No CT evidence of pulmonary embolism. The lungs appear  clear.    LD MILLS MD        Consultations:  Consultations This Hospital Stay   CARDIOLOGY IP CONSULT     Recent Lab Results:    Recent Labs  Lab " 08/01/17  0655 07/31/17  1351   WBC 7.5 9.5   HGB 14.0 14.7   HCT 41.3 43.6   MCV 92 91    198       Recent Labs  Lab 08/01/17  0655 07/31/17  1351    140   POTASSIUM 4.2 3.7   CHLORIDE 107 105   CO2 29 28   ANIONGAP 4 7   * 91   BUN 14 9   CR 0.98 0.98   GFRESTIMATED 88 88   GFRESTBLACK >90African American GFR Calc >90African American GFR Calc   ZAYDA 8.1* 8.2*       Recent Labs  Lab 08/01/17  0655 07/31/17  1351   CRP 66.7* 73.3*       Recent Labs  Lab 08/01/17  0155 07/31/17  2155 07/31/17  1815   TROPI 3.118* 4.373* 6.528*          Pending Results:    Unresulted Labs Ordered in the Past 30 Days of this Admission     No orders found for last 61 day(s).           Discharge Instructions and Follow-Up:   Discharge Orders     CRP inflammation     CRP inflammation     Follow-Up with Cardiac Advanced Practice Provider     Follow-Up with Cardiologist     Follow-Up with Cardiac Advanced Practice Provider     Reason for your hospital stay   You were hospitalized for myocarditis (inflammation of the heart muscle).     Follow-up and recommended labs and tests    Follow up with Cardiology as scheduled.     Activity   Your activity upon discharge: activity as tolerated     Full Code     Echocardiogram   The supervising Cardiologist may change the type of echocardiogram requested to answer a specific clinical question based on existing protocols in the Echocardiography laboratory.    Use of contrast is at the discretion of the supervising Cardiologist.     Diet   Follow this diet upon discharge: Orders Placed This Encounter     Combination Diet Regular Diet Adult       Discharge Medications   Current Discharge Medication List      START taking these medications    Details   ranitidine (ZANTAC) 150 MG tablet Take 1 tablet (150 mg) by mouth 2 times daily  Qty: 60 tablet, Refills: 0    Associated Diagnoses: Acute myocarditis, unspecified myocarditis type      aspirin 325 MG tablet Take 2 tablets (650 mg) by  mouth 3 times daily  Qty: 90 tablet, Refills: 0    Associated Diagnoses: Acute myocarditis, unspecified myocarditis type         CONTINUE these medications which have NOT CHANGED    Details   testosterone cypionate (DEPOTESTOTERONE) 200 MG/ML injection Inject 300 mg into the muscle Per pt, every 10 days           Time Spent on this Encounter   I, Renee Back, personally saw the patient today and spent greater than 30 minutes discharging this patient.    Renee Back MD

## 2017-08-01 NOTE — PLAN OF CARE
Problem: Goal Outcome Summary  Goal: Goal Outcome Summary  Outcome: Improving  AO, up independently. Denies pain, sob,nausea. VSS wnl. Plan to d/c home today. F/U with cardiology in 2-4 weeks.

## 2017-08-01 NOTE — DISCHARGE INSTRUCTIONS
1. Follow up with Cardiology in clinic as scheduled.  2. Take the high dose aspirin three times daily.  3. Take the Zantac twice daily to prevent an ulcer of the stomach from the high dose aspirin.  4. If you develop chest pain, shortness of breath or swelling in your legs you need to be seen again as these could be signs that you have developed heart failure.

## 2017-08-01 NOTE — PROGRESS NOTES
Discharge instructions reviewed, questions answered. Pt to  Rx at designated pharmacy. Follow up with Cardiology at scheduled appts.

## 2017-08-01 NOTE — PLAN OF CARE
Problem: Goal Outcome Summary  Goal: Goal Outcome Summary  Outcome: Improving  VSS, A/O w/cares, LS clear, no c/o pain, Tele SR, Trops trending down, resting well this shift, continue POC

## 2017-08-01 NOTE — PROGRESS NOTES
"Cardiology Progress Note  DANICA Torres          Assessment and Plan:   Admit (7/31) w/ 1 day hx of flu-like symptoms/CP  Evidence of troponin elevation and concern for myopericarditis.  No significant PMH    Myopericarditis:  -troponin peak 6.5/nonspecific ST abnormalities/LVEF 50% (global), borderline reduced RV function  No pericardial effusion  -resolved pleuritic CP  -troponin/CRP improving  -ASA 650mg TID until CRP normalizes  REST  -Home today.  F/U with [facility] in 2 wks w/ CRP and ECHO.  F/u again in 1 month with CRP  -no travel for 1 month               Interval History:   Sitting up in bed eating.  Denies CP/SOB/palpitaitons                Medications:       aspirin  650 mg Oral TID     ranitidine  150 mg Oral BID            Physical Exam:   Blood pressure 110/70, pulse 72, temperature 96.8  F (36  C), temperature source Oral, resp. rate 16, height 1.905 m (6' 3\"), weight 111.1 kg (245 lb), SpO2 98 %.  Wt Readings from Last 3 Encounters:   07/31/17 111.1 kg (245 lb)     I/O last 3 completed shifts:  In: 720 [P.O.:720]  Out: -     CONST:  Alert and oriented  NAD  LUNGS:  CTA bilat  CARDIO:  RRR,, S1, S2  No murmurs, rubs  ABD:  Soft  +BS  EXT:  No edema           Data:   TELE:  SR    CBC    Recent Labs  Lab 08/01/17  0655 07/31/17  1351   WBC 7.5 9.5   HGB 14.0 14.7    198       BMP    Recent Labs  Lab 08/01/17  0655 07/31/17  1351    140   POTASSIUM 4.2 3.7   CHLORIDE 107 105   ZAYDA 8.1* 8.2*   CO2 29 28   BUN 14 9   CR 0.98 0.98   * 91     No results for input(s): CHOL, HDL, LDL, TRIG, CHOLHDLRATIO in the last 42796 hours.    TROP  Lab Results   Component Value Date    TROPI 3.118 () 08/01/2017    TROPI 4.373 () 07/31/2017    TROPI 6.528 () 07/31/2017    TROPI 5.490 () 07/31/2017       BNP  No results for input(s): NTBNPI, NTBNP in the last 168 hours.          "

## 2017-08-01 NOTE — CONSULTS
Date of service:  07/31/2017    Requesting physician:  ER.      REASON FOR REFERRAL:  Chest pain, elevated cardiac enzymes.      History of Present Illness:  I have been asked to evaluate this very pleasant 32-year-old male for the above.  He explains that he woke up this morning with violent shivering, felt very clammy and had some chest tightness, worse with inspiration or cough.  He presented through the emergency room for further evaluation.  He has no prior heart disease history.  He notes that his father had a myocardial infarction in his late 60s.  He has no underlying personal history of hypertension, diabetes, hyperlipidemia or tobacco abuse.  He does note he was on a long flight from [place] and came back on Thursday.  He works for [employer] on 4 week stretches over in [place].  He does not note any preceding illness to his symptoms and he does not note any sick contacts.    PAST MEDICAL HISTORY:  None.      Medications:  None.      Allergies:  None.    PAST SURGICAL HISTORY:  None.    FAMILY HISTORY:  Again father had an myocardial infarction in his late 60s.    SOCIAL HISTORY:  Denies any alcohol, tobacco, or illicit drug use.    REVIEW OF SYSTEMS:  He does not note any febrile illness.  He denies previous history of chest pain, dyspnea on exertion or palpitations.  Does not note any leg swelling.  He has no history of internal bleeding, peptic ulcer disease.  Rest of the 10 point review of systems is described as above or is otherwise unremarkable.    PHYSICAL EXAM:    Vital signs:  His blood pressure is currently ranging between 92 and 142 systolic over 51-84 diastolic, heart rate is in the 60s to 70s.  Respiratory rate 18, he is oxygenating at least 97% on room air.  He is afebrile.    General:  He is an otherwise healthy appearing 32-year-old male, in no apparent distress.  Eupneic on exam and with conversation.    HEENT:  Head is atraumatic, normocephalic.  Pupils are equal and small.  Conjunctiva clear.   Oropharynx is clear.  NECK:  Supple.  I do not appreciate carotid bruits.    Cardiovascular:  Tones are regular, distant.  I do not appreciate a murmur, gallop or rub.    Lungs:  Clear posteriorly.    Abdomen:  He has positive bowel sounds.  No abdominal bruits.    Extremities:  Palpable pulses in the distal extremities without peripheral edema.   Skin:  Skin is pale warm dry without rash or jaundice.  He is alert and oriented.  There are no gross focal neurologic abnormalities.      Diagnostic data:  An electrocardiogram on admission, which I have reviewed, demonstrates a normal sinus rhythm with diffuse ST-segment elevation and some MO depression noted in the inferior leads.  Initial troponin level was measured at 5.49, CRP is 73.  Electrolyte panel and CBC are normal.  D-dimer was normal.  Chest CT was checked for PE and was negative.  Echocardiogram suggests borderline low normal LV systolic function.  The ejection fraction is around 50%.  No significant valvular disease noted.      ASSESSMENT AND PLAN:  This is a 32-year-old male with evidence of myopericarditis with no previous history, otherwise healthy.  He is comfortable at rest.  Currently I would recommend a minimum 24 hours monitoring on tele for arrhythmias and symptomatic treatment for his discomfort with nonsteroidals, preferably high-dose aspirin for treatment of his myopericarditis.  I will place a month 650 mg 3 times a day and would like to see complete resolution of his elevated CRP levels prior to discontinuing this medication.  I would also recommend a followup limited echocardiogram for any change in his status or in 2 to 3 months to look for improvement in his overall function.  Also with high-dose aspirin, we will want to protect his stomach with an acid suppressant such as Zantac.  He is scheduled to return to [place] in about 4 weeks.  He will need a minimum of 2 to 4 weeks of very light activity and no overseas travel.  I have asked him to  make arrangements with his boss that he may not be able to make this trip.  I am happy to follow along in his care.  Please feel free to contact me with any questions you have in regards to his care.        Maylin Miles DO    D:  07/31/2017 18:12 T:  07/31/2017 23:22  Document:  [id number] CD\CD

## 2017-08-01 NOTE — PLAN OF CARE
Problem: Goal Outcome Summary  Goal: Goal Outcome Summary  Outcome: No Change  VSS, afebrile. AAOx4. Up independently.  Denies pain, denies chest pain, denies SOB. Tele reads SR with inverted T waves. 2200 troponin- 4.373.  PIV SL. Tolerates diet, denies nausea.  LS-clear.  Skin intact.  Shower in PM.  Continue to monitor.

## 2017-08-02 ENCOUNTER — CARE COORDINATION (OUTPATIENT)
Dept: CARDIOLOGY | Facility: CLINIC | Age: 32
End: 2017-08-02

## 2017-08-02 NOTE — PROGRESS NOTES
"RN attempted x1 to call patient at only phone number listed and received message stating \" the individual you are trying to reach is not taking calls at this time goodbye.\" RN called patient's brother Nain (listed emergency contact) and left VM advising him to have patient call clinic to discuss cardiology f/u plan and any questions he may have.   "

## 2017-08-15 ENCOUNTER — HOSPITAL ENCOUNTER (OUTPATIENT)
Dept: CARDIOLOGY | Facility: CLINIC | Age: 32
Discharge: HOME | End: 2017-08-15
Attending: NURSE PRACTITIONER | Admitting: NURSE PRACTITIONER
Payer: COMMERCIAL

## 2017-08-15 DIAGNOSIS — I40.9 ACUTE MYOCARDITIS, UNSPECIFIED MYOCARDITIS TYPE: ICD-10-CM

## 2017-08-15 LAB — CRP SERPL-MCNC: 5.4 MG/L (ref 0–8)

## 2017-08-15 PROCEDURE — 40000264 ECHO COMPLETE WITH OPTISON

## 2017-08-15 PROCEDURE — 86140 C-REACTIVE PROTEIN: CPT | Performed by: NURSE PRACTITIONER

## 2017-08-15 PROCEDURE — 25500064 ZZH RX 255 OP 636: Performed by: NURSE PRACTITIONER

## 2017-08-15 PROCEDURE — 93306 TTE W/DOPPLER COMPLETE: CPT | Mod: 26 | Performed by: INTERNAL MEDICINE

## 2017-08-15 RX ADMIN — HUMAN ALBUMIN MICROSPHERES AND PERFLUTREN 6 ML: 10; .22 INJECTION, SOLUTION INTRAVENOUS at 14:00

## 2017-08-16 ENCOUNTER — OFFICE VISIT (OUTPATIENT)
Dept: CARDIOLOGY | Facility: CLINIC | Age: 32
End: 2017-08-16
Attending: NURSE PRACTITIONER
Payer: COMMERCIAL

## 2017-08-16 ENCOUNTER — TELEPHONE (OUTPATIENT)
Dept: CARDIOLOGY | Facility: CLINIC | Age: 32
End: 2017-08-16

## 2017-08-16 VITALS
HEIGHT: 71 IN | BODY MASS INDEX: 36.37 KG/M2 | SYSTOLIC BLOOD PRESSURE: 125 MMHG | DIASTOLIC BLOOD PRESSURE: 85 MMHG | HEART RATE: 70 BPM | WEIGHT: 259.8 LBS

## 2017-08-16 DIAGNOSIS — I40.9 ACUTE MYOCARDITIS, UNSPECIFIED MYOCARDITIS TYPE: ICD-10-CM

## 2017-08-16 PROCEDURE — 99214 OFFICE O/P EST MOD 30 MIN: CPT | Performed by: NURSE PRACTITIONER

## 2017-08-16 NOTE — PROGRESS NOTES
"HPI and Plan: #[id number]  See dictation    Orders Placed This Encounter   Procedures     Follow-Up with Cardiologist       No orders of the defined types were placed in this encounter.      Medications Discontinued During This Encounter   Medication Reason     aspirin 325 MG tablet      ranitidine (ZANTAC) 150 MG tablet          Encounter Diagnosis   Name Primary?     Acute myocarditis, unspecified myocarditis type        CURRENT MEDICATIONS:  Current Outpatient Prescriptions   Medication Sig Dispense Refill     testosterone cypionate (DEPOTESTOTERONE) 200 MG/ML injection Inject 300 mg into the muscle Per pt, every 10 days         ALLERGIES   No Known Allergies    PAST MEDICAL HISTORY:  Past Medical History:   Diagnosis Date     NO ACTIVE PROBLEMS        PAST SURGICAL HISTORY:  Past Surgical History:   Procedure Laterality Date     ELBOW SURGERY      arthroscopic surgery       FAMILY HISTORY:  Family History   Problem Relation Age of Onset     Coronary Artery Disease Father      In his 60s       SOCIAL HISTORY:  Social History     Social History     Marital status: Single     Spouse name: N/A     Number of children: N/A     Years of education: N/A     Social History Main Topics     Smoking status: Never Smoker     Smokeless tobacco: None     Alcohol use None     Drug use: None     Sexual activity: Not Asked     Other Topics Concern     None     Social History Narrative       Review of Systems:  Skin:  Negative       Eyes:  Negative      ENT:  Positive for hearing loss partial in right ear  Respiratory:  Negative       Cardiovascular:  Negative      Gastroenterology: Negative      Genitourinary:  Negative      Musculoskeletal:  Negative      Neurologic:  Negative      Psychiatric:  Negative      Heme/Lymph/Imm:  Negative      Endocrine:  Negative        Physical Exam:  Vitals: /85 (BP Location: Right arm, Patient Position: Chair, Cuff Size: Adult Large)  Pulse 70  Ht 1.803 m (5' 11\")  Wt 117.8 kg (259 lb 12.8 " oz)  BMI 36.23 kg/m2    Constitutional:  cooperative;alert and oriented        Skin:  warm and dry to the touch        Head:  normocephalic        Eyes:  pupils equal and round        ENT:  no pallor or cyanosis        Neck:  JVP normal        Chest:  clear to auscultation;normal respiratory excursion          Cardiac: regular rhythm;normal S1 and S2     no presence of murmur            Abdomen:  abdomen soft        Vascular: pulses full and equal                                        Extremities and Back:  no edema;no deformities, clubbing, cyanosis, erythema observed              Neurological:  affect appropriate, oriented to time, person and place              DANICA Mares CNP  [address]  [place] [id number]

## 2017-08-16 NOTE — TELEPHONE ENCOUNTER
Symptoms resolved  CRP normalized  No change in LVEF/RVF. I have stopped his ASA/Zantac  He is leaving for [place] next week.  He would like for you to comment on Testosterone in relation to his ECHO results  I set f/u with you for when he returns next month  His DBP was borderline Thanks, GERARD

## 2017-08-16 NOTE — MR AVS SNAPSHOT
After Visit Summary   8/16/2017    Nick Rose    MRN: [id number]           Patient Information     Date Of Birth          1985        Visit Information        Provider Department      8/16/2017 12:30 PM Dora Soto APRN CNP [facility]        Today's Diagnoses     Acute myocarditis, unspecified myocarditis type           Follow-ups after your visit        Additional Services     Follow-Up with Cardiologist                 Your next 10 appointments already scheduled     Aug 29, 2017  1:30 PM CDT   LAB with RU LAB   [facility] ([facility])    [id number] [place] [address] [address]  [facility] [id number] [phone]           Patient must bring picture ID. Patient should be prepared to give a urine specimen  Please do not eat 10-12 hours before your appointment if you are coming in fasting for labs on lipids, cholesterol, or glucose (sugar). Pregnant women should follow their Care Team instructions. Water with medications is okay. Do not drink coffee or other fluids. If you have concerns about taking  your medications, please ask at office or if scheduling via [employer], send a message by clicking on Secure Messaging, Message Your Care Team.            Aug 29, 2017  2:30 PM CDT   Return Discharge with DANICA Mendoza CNP   [facility] ([facility])    [id number] [place] [address] [address]  [facility] [id number] [phone]            Oct 05, 2017  5:15 PM CDT   Return Visit with Maylin Miles,    [facility] ([facility])    [address] [place] [address] [address]  [facility] [id number] [phone]              Future tests that were ordered for you today     Open Future Orders        Priority Expected Expires Ordered    Follow-Up with Cardiologist Routine 9/28/2017 8/16/2018 8/16/2017    ECHO COMPLETE WITH  "OPTISON Routine 8/15/2017 2018 2017            Who to contact     If you have questions or need follow up information about today's clinic visit or your schedule please contact [facility] [place] directly at [phone].  Normal or non-critical lab and imaging results will be communicated to you by MyChart, letter or phone within 4 business days after the clinic has received the results. If you do not hear from us within 7 days, please contact the clinic through MyChart or phone. If you have a critical or abnormal lab result, we will notify you by phone as soon as possible.  Submit refill requests through [employer] or call your pharmacy and they will forward the refill request to us. Please allow 3 business days for your refill to be completed.          Additional Information About Your Visit        [facility] Information     [employer] lets you send messages to your doctor, view your test results, renew your prescriptions, schedule appointments and more. To sign up, go to [email or web address] . Click on \"Log in\" on the left side of the screen, which will take you to the Welcome page. Then click on \"Sign up Now\" on the right side of the page.     You will be asked to enter the access code listed below, as well as some personal information. Please follow the directions to create your username and password.     Your access code is: [email or web address]  Expires: 10/30/2017 12:48 PM     Your access code will  in 90 days. If you need help or a new code, please call your [facility] clinic or [phone].        Care EveryWhere ID     This is your Care EveryWhere ID. This could be used by other organizations to access your [facility] medical records  [id number]        Your Vitals Were     Pulse Height BMI (Body Mass Index)             70 1.803 m (5' 11\") 36.23 kg/m2          Blood Pressure from Last 3 Encounters:   17 125/85   17 110/70    Weight from Last 3 Encounters: "   08/16/17 117.8 kg (259 lb 12.8 oz)   07/31/17 111.1 kg (245 lb)              We Performed the Following     Follow-Up with Cardiac Advanced Practice Provider        Primary Care Provider Office Phone # Fax #    Hugo Vegas [phone]       [facility] [address] [facility] [address]        Equal Access to Services     [facility] : Hadii aad ku hadasho Soomaali, waaxda luqadaha, qaybta kaalmada adeegyada, waxay idiin hayaan adeeg kharash la'aan ah. So [facility] [phone].    ATENCIÓN: Si habla español, tiene a grant disposición servicios gratuitos de asistencia lingüística. Pietroame al [phone].    We comply with applicable federal civil rights laws and [place] laws. We do not discriminate on the basis of race, color, national origin, age, disability sex, sexual orientation or gender identity.            Thank you!     Thank you for choosing [facility] [place]  for your care. Our goal is always to provide you with excellent care. Hearing back from our patients is one way we can continue to improve our services. Please take a few minutes to complete the written survey that you may receive in the mail after your visit with us. Thank you!             Your Updated Medication List - Protect others around you: Learn how to safely use, store and throw away your medicines at www.disposemymeds.org.          This list is accurate as of: 8/16/17 12:48 PM.  Always use your most recent med list.                   Brand Name Dispense Instructions for use Diagnosis    aspirin 325 MG tablet     90 tablet    Take 2 tablets (650 mg) by mouth 3 times daily    Acute myocarditis, unspecified myocarditis type       ranitidine 150 MG tablet    ZANTAC    60 tablet    Take 1 tablet (150 mg) by mouth 2 times daily    Acute myocarditis, unspecified myocarditis type       testosterone cypionate 200 MG/ML injection    DEPOTESTOTERONE     Inject 300 mg into  the muscle Per pt, every 10 days

## 2017-08-16 NOTE — PROGRESS NOTES
DATE OF SERVICE:  08/16/2017.       HISTORY OF PRESENT ILLNESS:  Nick Rose is a 32-year-old male who presents to [facility] today for a followup visit.  He is a patient of Dr. Miles seen in our clinic for myopericarditis.      Nick has no previous history of cardiovascular disease.  He has been treated with testosterone for hypogonadism.  Unfortunately admitted to the hospital on 07/31/2017 after experiencing a 1-day history of flu-like symptoms and chest pain.  His chest pain symptoms were pleuritic in nature.  He was found to have a troponin elevation of 6.5 and nonspecific ST abnormalities on electrocardiogram.  Echocardiogram demonstrated left ventricular ejection fraction of 50% with global hypokinesia and borderline reduced right ventricular function.  A CRP level of 66 and a BNP level of 610.  He was treated for myopericarditis.  Aspirin 650 mg 3 times a day was started along with Zantac.  Rest was recommended.  He was discharged without any complications.  He returns today after undergoing a repeat echocardiogram and CRP level.      Nick overall tells me he is doing well.  He has not had any chest pain with activity or at rest.  He has not been short of breath.  He has no sensations of palpitations, lightheadedness, dizziness, orthopnea or peripheral edema.  He actually is feeling really good.      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      I reviewed his CRP levels which is now normal at 5.4.  I reviewed his echocardiogram with him showing a left ventricular ejection fraction of 50% -55%, borderline reduced right ventricular function, no significant valvular disease and no significant change from previous echocardiogram.      PHYSICAL EXAMINATION:  His blood pressure today is 125/85 mmHg, heart rate 70 beats per minute and is regular.  His lungs are clear.  There is no peripheral edema.  Weight 259 pounds.      IMPRESSION AND PLAN:    1.  Recent myopericarditis.  His symptoms have resolved.  His CRP level has normalized.  His left ventricular function is 50% with borderline reduced right ventricular function.  There are no signs and symptoms of heart failure and he is feeling well.  He is planning to leave for [place] next week for 1 month.  I have asked him to stop his aspirin and Zantac at this time.  He should pay attention to any chest pain symptoms which he would need to restart his aspirin t.i.d.  I have asked him to avoid salty foods as much as possible.  He will return for followup with Dr. Miles when he returns from [place].     2.  Borderline elevated diastolic blood pressure.  His blood pressure is well controlled in the hospital.  He tells me today he did drink a full monster drink.  I have encouraged him to stop drinking high caffeinated beverages and monitor his blood pressure in [place] intermittently.   3.  History of hypogonadism on daily testosterone.  I will further discuss with Dr. Miles recommendations given his recent echocardiograms.         DANICA LUCAS, JAIR             D: 2017 13:05   T: 2017 17:42   MT: DHAVAL      Name:     RAUDEL GLEZ   MRN:      [id number]        Account:      [id number]   :      1985           Visit Date:   2017      Document: [id number]

## 2017-08-16 NOTE — LETTER
8/16/2017    Hugo Vegas MD  [facility]   [address] [address] [facility] [address]    RE: Nick Rose       Dear Colleague,    I had the pleasure of seeing Nick Rose in the [facility].    DATE OF SERVICE:  08/16/2017.       Nick Rose is a 32-year-old male who presents to [facility] today for a followup visit.  He is a patient of Dr. Miles seen in our clinic for myopericarditis.      Nick has no previous history of cardiovascular disease.  He has been treated with testosterone for hypogonadism.  Unfortunately admitted to the hospital on 07/31/2017 after experiencing a 1-day history of flu-like symptoms and chest pain.  His chest pain symptoms were pleuritic in nature.  He was found to have a troponin elevation of 6.5 and nonspecific ST abnormalities on electrocardiogram.  Echocardiogram demonstrated left ventricular ejection fraction of 50% with global hypokinesia and borderline reduced right ventricular function.  A CRP level of 66 and a BNP level of 610.  He was treated for myopericarditis.  Aspirin 650 mg 3 times a day was started along with Zantac.  Rest was recommended.  He was discharged without any complications.  He returns today after undergoing a repeat echocardiogram and CRP level.      Nick overall tells me he is doing well.  He has not had any chest pain with activity or at rest.  He has not been short of breath.  He has no sensations of palpitations, lightheadedness, dizziness, orthopnea or peripheral edema.  He actually is feeling really good.      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      I reviewed his CRP levels which is now normal at 5.4.  I reviewed his echocardiogram with him showing a left ventricular ejection fraction of 50% -55%, borderline reduced right ventricular function, no significant valvular disease and no significant change from previous  echocardiogram.      PHYSICAL EXAMINATION:  His blood pressure today is 125/85 mmHg, heart rate 70 beats per minute and is regular.  His lungs are clear.  There is no peripheral edema.  Weight 259 pounds.   Outpatient Encounter Prescriptions as of 8/16/2017   Medication Sig Dispense Refill     testosterone cypionate (DEPOTESTOTERONE) 200 MG/ML injection Inject 300 mg into the muscle Per pt, every 10 days       [DISCONTINUED] ranitidine (ZANTAC) 150 MG tablet Take 1 tablet (150 mg) by mouth 2 times daily 60 tablet 0     [DISCONTINUED] aspirin 325 MG tablet Take 2 tablets (650 mg) by mouth 3 times daily 90 tablet 0     No facility-administered encounter medications on file as of 8/16/2017.      IMPRESSION AND PLAN:   1.  Recent myopericarditis.  His symptoms have resolved.  His CRP level has normalized.  His left ventricular function is 50% with borderline reduced right ventricular function.  There are no signs and symptoms of heart failure and he is feeling well.  He is planning to leave for [place] next week for 1 month.  I have asked him to stop his aspirin and Zantac at this time.  He should pay attention to any chest pain symptoms which he would need to restart his aspirin t.i.d.  I have asked him to avoid salty foods as much as possible.  He will return for followup with Dr. Miles when he returns from [place].     2.  Borderline elevated diastolic blood pressure.  His blood pressure is well controlled in the hospital.  He tells me today he did drink a full monster drink.  I have encouraged him to stop drinking high caffeinated beverages and monitor his blood pressure in [place] intermittently.   3.  History of hypogonadism on daily testosterone.  I will further discuss with Dr. Miles recommendations given his recent echocardiograms.     Again, thank you for allowing me to participate in the care of your patient.      Sincerely,    DANICA Salgado [facility]

## 2017-08-23 NOTE — TELEPHONE ENCOUNTER
Pt recently increased his Testosterone injection dose.  Would this be a player in his low normal LVEF and would you recommend reducing dose back?  Thanks, GERARD

## 2017-10-04 PROBLEM — I30.9 ACUTE MYOPERICARDITIS: Status: ACTIVE | Noted: 2017-10-04

## 2017-10-04 PROBLEM — E29.1 HYPOGONADISM MALE: Status: ACTIVE | Noted: 2017-10-04

## 2017-10-04 NOTE — TELEPHONE ENCOUNTER
I doubt it has any effect. No recommendations on testosterone. This however can increase hypercoagulability.

## 2017-10-05 ENCOUNTER — OFFICE VISIT (OUTPATIENT)
Dept: CARDIOLOGY | Facility: CLINIC | Age: 32
End: 2017-10-05
Attending: NURSE PRACTITIONER
Payer: COMMERCIAL

## 2017-10-05 VITALS
DIASTOLIC BLOOD PRESSURE: 82 MMHG | HEIGHT: 71 IN | WEIGHT: 260.4 LBS | BODY MASS INDEX: 36.46 KG/M2 | SYSTOLIC BLOOD PRESSURE: 138 MMHG | HEART RATE: 76 BPM

## 2017-10-05 DIAGNOSIS — I40.9 ACUTE MYOCARDITIS, UNSPECIFIED MYOCARDITIS TYPE: ICD-10-CM

## 2017-10-05 PROCEDURE — 99213 OFFICE O/P EST LOW 20 MIN: CPT | Performed by: INTERNAL MEDICINE

## 2017-10-05 NOTE — PROGRESS NOTES
"HPI and Plan:   See dictation    Orders Placed This Encounter   Procedures     MRI Cardiac w/contrast     Follow-Up with Cardiologist       No orders of the defined types were placed in this encounter.      There are no discontinued medications.      Encounter Diagnosis   Name Primary?     Acute myocarditis, unspecified myocarditis type        CURRENT MEDICATIONS:  Current Outpatient Prescriptions   Medication Sig Dispense Refill     testosterone cypionate (DEPOTESTOTERONE) 200 MG/ML injection Inject 300 mg into the muscle Per pt, every 10 days         ALLERGIES   No Known Allergies    PAST MEDICAL HISTORY:  Past Medical History:   Diagnosis Date     Acute myopericarditis 10/4/2017     Chest pain 7/31/2017     Hypogonadism male 10/4/2017     NO ACTIVE PROBLEMS        PAST SURGICAL HISTORY:  Past Surgical History:   Procedure Laterality Date     ELBOW SURGERY      arthroscopic surgery       FAMILY HISTORY:  Family History   Problem Relation Age of Onset     Coronary Artery Disease Father      In his 60s       SOCIAL HISTORY:  Social History     Social History     Marital status: Single     Spouse name: N/A     Number of children: N/A     Years of education: N/A     Social History Main Topics     Smoking status: Never Smoker     Smokeless tobacco: Never Used     Alcohol use No     Drug use: No     Sexual activity: Not Currently     Other Topics Concern     None     Social History Narrative       Review of Systems:  Skin:  Negative       Eyes:  Negative      ENT:  Negative      Respiratory:  Negative       Cardiovascular:  Negative      Gastroenterology: Negative      Genitourinary:  not assessed      Musculoskeletal:  not assessed      Neurologic:  not assessed      Psychiatric:  Negative      Heme/Lymph/Imm:  not assessed      Endocrine:  Negative        Physical Exam:  Vitals: /82 (BP Location: Right arm, Patient Position: Sitting, Cuff Size: Adult Large)  Pulse 76  Ht 1.803 m (5' 11\")  Wt 118.1 kg (260 lb " 6.4 oz)  BMI 36.32 kg/m2    Constitutional:  cooperative;alert and oriented        Skin:  warm and dry to the touch        Head:  normocephalic        Eyes:  pupils equal and round        ENT:  no pallor or cyanosis        Neck:  JVP normal        Chest:  clear to auscultation;normal respiratory excursion          Cardiac: regular rhythm;normal S1 and S2     no presence of murmur            Abdomen:  abdomen soft        Vascular: pulses full and equal                                        Extremities and Back:  no edema;no deformities, clubbing, cyanosis, erythema observed              Neurological:  affect appropriate, oriented to time, person and place              DANICA Mares CNP  [address]  [place] [id number]

## 2017-10-05 NOTE — LETTER
10/5/2017    Hugo Vegas MD  [facility]   [address] [address] [facility] [address]      RE: Nick LAST [place]       Dear Colleague,    I had the pleasure of seeing Nick Rose in the [facility].    REFERRING PHYSICIAN:  Dr. Hugo Vegas.      HISTORY OF PRESENT ILLNESS:  Mr. Rose is a pleasant 32-year-old male who I initially saw the end of July for chest pains which was diagnosed as mild pericarditis.  He was treated with high-dose aspirin and followed until CRP levels had normalized.  He had seen our nurse practitioner here in August and had been feeling quite well at that time, no recurrent chest pains.  The CRP came back at 5.4, which to me, still I believe is elevated, but it was within their normal reference range for that type of assay.  He was subsequently taken off of aspirin at that time.  He is scheduled to go back to [facility] here for his job for a month.  He is coming back with a followup visit.  On 08/15 he did have a repeat echocardiogram as he had borderline reduced LV function during his hospitalization.  This echocardiogram, however, continues to show borderline reduced LV systolic function with an EF estimated around 50%-55%, so there has been no improvement.  Mr. Rose states he is feeling healthy.  He has not had any recurrent chest pains.  He denies any difficulty breathing.  He is very active.  He feels well in general.      PHYSICAL EXAMINATION:    VITAL SIGNS:  His blood pressure is 138/82, pulse is 76.  Weight 260, body mass index is 36.     CARDIOVASCULAR:  Cardiovascular tones are regular.  He does have respiratory variation to his heart rate.  I do not appreciate a murmur, gallop or rub.   LUNGS:  Clear posteriorly.    EXTREMITIES:  He has no peripheral edema.     Outpatient Encounter Prescriptions as of 10/5/2017   Medication Sig Dispense Refill     testosterone cypionate (DEPOTESTOTERONE) 200 MG/ML injection Inject 300 mg  into the muscle Per pt, every 10 days       No facility-administered encounter medications on file as of 10/5/2017.       ASSESSMENT AND PLAN:  In summary, Mr. Rose is a very pleasant 32-year-old male with evidence of mild pericarditis and borderline reduced LV systolic function by echocardiogram repeated in August.  He is scheduled to return to [place] for his job here in the next few days, and he is asymptomatic from a cardiac perspective at this time.  I will give him the clearance to go ahead with his job and travels.  However, I am concerned that his LV function remains borderline reduced.  He will be back within a month.  I have asked him to follow up with us and undergo cardiac MRI for a more precise measurement of his ejection fraction to see if there is any evidence of scarring or inflammation in the myocardium.  If he does continue to have borderline reduced LV function, I may consider putting him on a low dose beta blocker for this.  I will have him follow up with the results of the cardiac MRI once complete.  Please feel free to contact me with any questions you have in regards to his care.     Again, thank you for allowing me to participate in the care of your patient.      Sincerely,    Maylin Miles, DO     [facility]

## 2017-10-05 NOTE — MR AVS SNAPSHOT
After Visit Summary   10/5/2017    Nick Rose    MRN: [id number]           Patient Information     Date Of Birth          1985        Visit Information        Provider Department      10/5/2017 5:15 PM Maylin Miles DO [facility] PHYSICIANS HEART AT [place]        Today's Diagnoses     Acute myocarditis, unspecified myocarditis type           Follow-ups after your visit        Additional Services     Follow-Up with Cardiologist                 Your next 10 appointments already scheduled     Nov 17, 2017  9:00 AM CST   MR MYOCARDIUM W CONTRAST with SCIMR1   [facility] (Cardiovascular Imaging at [facility])    [address] [place] [address]  [address]  [facility] [address] [phone]           Take your medicines as usual, unless your doctor tells you not to. Bring a list of your current medicines to your exam (including vitamins, minerals and over-the-counter drugs).  You will be given intravenous contrast for this exam. To prepare:   The day before your exam, drink extra fluids at least six 8-ounce glasses (unless your doctor tells you to restrict your fluids).   Have a blood test (creatinine test) within 30 days of your exam. Go to your clinic or Diagnostic Imaging Department for this test.  The MRI machine uses a strong magnet. Please wear clothes without metal (snaps, zippers). A sweatsuit works well, or we may give you a hospital gown.  Please remove any body piercings and hair extensions before you arrive. You will also remove watches, jewelry, hairpins, wallets, dentures, partial dental plates and hearing aids. You may wear contact lenses, and you may be able to wear your rings. We have a safe place to keep your personal items, but it is safer to leave them at home.   **IMPORTANT** THE INSTRUCTIONS BELOW ARE ONLY FOR THOSE PATIENTS WHO HAVE BEEN TOLD THEY WILL RECEIVE SEDATION OR GENERAL ANESTHESIA DURING THEIR MRI PROCEDURE:   IF YOU WILL RECEIVE SEDATION (take medicine to help you relax during your exam):   You must get the medicine from your doctor before you arrive. Bring the medicine to the exam. Do not take it at home.   Arrive one hour early. Bring someone who can take you home after the test. Your medicine will make you sleepy. After the exam, you may not drive, take a bus or take a taxi by yourself.   No eating 8 hours before your exam. You may have clear liquids up until 4 hours before your exam. (Clear liquids include water, clear tea, black coffee and fruit juice without pulp.)  IF YOU WILL RECEIVE ANESTHESIA (be asleep for your exam):   Arrive 1 1/2 hours early. Bring someone who can take you home after the test. You may not drive, take a bus or take a taxi by yourself.   No eating 8 hours before your exam. You may have clear liquids up until 4 hours before your exam. (Clear liquids include water, clear tea, black coffee and fruit juice without pulp.)  Please call the Imaging Department at your exam site with any questions.            Dec 07, 2017  1:15 PM CST   Return Visit with Maylin Miles DO   [facility] PHYSICIANS HEART AT [place] ([facility])    [address]  [facility] [address] [phone]              Future tests that were ordered for you today     Open Future Orders        Priority Expected Expires Ordered    MRI Cardiac w/contrast Routine 11/4/2017 10/5/2018 10/5/2017    Follow-Up with Cardiologist Routine 11/4/2017 10/5/2018 10/5/2017            Who to contact     If you have questions or need follow up information about today's clinic visit or your schedule please contact [facility] PHYSICIANS HEART AT [place] directly at [phone].  Normal or non-critical lab and imaging results will be communicated to you by MyChart, letter or phone within 4 business days after the clinic has received the results. If you do not hear from us within 7  "days, please contact the clinic through [employer] or phone. If you have a critical or abnormal lab result, we will notify you by phone as soon as possible.  Submit refill requests through [employer] or call your pharmacy and they will forward the refill request to us. Please allow 3 business days for your refill to be completed.          Additional Information About Your Visit        [employer] Information     [employer] lets you send messages to your doctor, view your test results, renew your prescriptions, schedule appointments and more. To sign up, go to [email or web address] . Click on \"Log in\" on the left side of the screen, which will take you to the Welcome page. Then click on \"Sign up Now\" on the right side of the page.     You will be asked to enter the access code listed below, as well as some personal information. Please follow the directions to create your username and password.     Your access code is: [email or web address]  Expires: 10/30/2017 12:48 PM     Your access code will  in 90 days. If you need help or a new code, please call your [place] clinic or [phone].        Care EveryWhere ID     This is your Care EveryWhere ID. This could be used by other organizations to access your [place] medical records  [id number]        Your Vitals Were     Pulse Height BMI (Body Mass Index)             76 1.803 m (5' 11\") 36.32 kg/m2          Blood Pressure from Last 3 Encounters:   10/05/17 138/82   17 125/85   17 110/70    Weight from Last 3 Encounters:   10/05/17 118.1 kg (260 lb 6.4 oz)   17 117.8 kg (259 lb 12.8 oz)   17 111.1 kg (245 lb)              We Performed the Following     Follow-Up with Cardiologist        Primary Care Provider Office Phone # Fax #    Hugo Vegas [phone]       [facility] [address] [facility] [address]        Equal Access to Services     LAURIE BAZZI AH: Hadii king Stephens, " noemy sahu kaylynty zaragozaholden stone ah. So [facility] [phone].    ATENCIÓN: Si damon almaguer, tiene a grant disposición servicios gratuitos de asistencia lingüística. Corina al [phone].    We comply with applicable federal civil rights laws and [place] laws. We do not discriminate on the basis of race, color, national origin, age, disability, sex, sexual orientation, or gender identity.            Thank you!     Thank you for choosing [facility] PHYSICIANS HEART AT [place]  for your care. Our goal is always to provide you with excellent care. Hearing back from our patients is one way we can continue to improve our services. Please take a few minutes to complete the written survey that you may receive in the mail after your visit with us. Thank you!             Your Updated Medication List - Protect others around you: Learn how to safely use, store and throw away your medicines at www.disposemymeds.org.          This list is accurate as of: 10/5/17  5:26 PM.  Always use your most recent med list.                   Brand Name Dispense Instructions for use Diagnosis    testosterone cypionate 200 MG/ML injection    DEPOTESTOTERONE     Inject 300 mg into the muscle Per pt, every 10 days

## 2017-10-06 NOTE — PROGRESS NOTES
REFERRING PHYSICIAN:  Dr. Hugo Vegas.      HISTORY OF PRESENT ILLNESS:  Mr. Rose is a pleasant 32-year-old male who I initially saw the end of July for chest pains which was diagnosed as mild pericarditis.  He was treated with high-dose aspirin and followed until CRP levels had normalized.  He had seen our nurse practitioner here in August and had been feeling quite well at that time, no recurrent chest pains.  The CRP came back at 5.4, which to me, still I believe is elevated, but it was within their normal reference range for that type of assay.  He was subsequently taken off of aspirin at that time.  He is scheduled to go back to [place] here for his job for a month.  He is coming back with a followup visit.  On 08/15 he did have a repeat echocardiogram as he had borderline reduced LV function during his hospitalization.  This echocardiogram, however, continues to show borderline reduced LV systolic function with an EF estimated around 50%-55%, so there has been no improvement.  Mr. Rose states he is feeling healthy.  He has not had any recurrent chest pains.  He denies any difficulty breathing.  He is very active.  He feels well in general.      PHYSICAL EXAMINATION:    VITAL SIGNS:  His blood pressure is 138/82, pulse is 76.  Weight 260, body mass index is 36.     CARDIOVASCULAR:  Cardiovascular tones are regular.  He does have respiratory variation to his heart rate.  I do not appreciate a murmur, gallop or rub.   LUNGS:  Clear posteriorly.    EXTREMITIES:  He has no peripheral edema.      ASSESSMENT AND PLAN:  In summary, Mr. Rose is a very pleasant 32-year-old male with evidence of mild pericarditis and borderline reduced LV systolic function by echocardiogram repeated in August.  He is scheduled to return to [place] for his job here in the next few days, and he is asymptomatic from a cardiac perspective at this time.  I will give him the clearance to go ahead with his job and travels.  However, I am  concerned that his LV function remains borderline reduced.  He will be back within a month.  I have asked him to follow up with us and undergo cardiac MRI for a more precise measurement of his ejection fraction to see if there is any evidence of scarring or inflammation in the myocardium.  If he does continue to have borderline reduced LV function, I may consider putting him on a low dose beta blocker for this.  I will have him follow up with the results of the cardiac MRI once complete.  Please feel free to contact me with any questions you have in regards to his care.      cc:   Hugo Vegas MD    [facility]    [address] [place]  [address]         ROLAND LOVE DO             D: 10/05/2017 17:26   T: 10/06/2017 01:48   MT: MONO      Name:     RAUDEL GLEZ   MRN:      [id number]        Account:      [id number]   :      1985           Service Date: 10/05/2017      Document: [id number]

## 2017-10-11 ENCOUNTER — TRANSFERRED RECORDS (OUTPATIENT)
Dept: HEALTH INFORMATION MANAGEMENT | Facility: CLINIC | Age: 32
End: 2017-10-11

## 2017-11-17 ENCOUNTER — HOSPITAL ENCOUNTER (OUTPATIENT)
Dept: CARDIOLOGY | Facility: CLINIC | Age: 32
Discharge: HOME | End: 2017-11-17
Attending: INTERNAL MEDICINE | Admitting: INTERNAL MEDICINE
Payer: COMMERCIAL

## 2017-11-17 DIAGNOSIS — I40.9 ACUTE MYOCARDITIS, UNSPECIFIED MYOCARDITIS TYPE: ICD-10-CM

## 2017-11-17 PROCEDURE — 75561 CARDIAC MRI FOR MORPH W/DYE: CPT

## 2017-11-17 PROCEDURE — 25000128 H RX IP 250 OP 636: Performed by: INTERNAL MEDICINE

## 2017-11-17 PROCEDURE — A9585 GADOBUTROL INJECTION: HCPCS | Performed by: INTERNAL MEDICINE

## 2017-11-17 PROCEDURE — 75561 CARDIAC MRI FOR MORPH W/DYE: CPT | Mod: 26 | Performed by: INTERNAL MEDICINE

## 2017-11-17 RX ORDER — GADOBUTROL 604.72 MG/ML
5-65 INJECTION INTRAVENOUS ONCE
Status: COMPLETED | OUTPATIENT
Start: 2017-11-17 | End: 2017-11-17

## 2017-11-17 RX ADMIN — GADOBUTROL 15 ML: 604.72 INJECTION INTRAVENOUS at 11:08

## 2017-12-07 ENCOUNTER — OFFICE VISIT (OUTPATIENT)
Dept: CARDIOLOGY | Facility: CLINIC | Age: 32
End: 2017-12-07
Payer: COMMERCIAL

## 2017-12-07 VITALS
WEIGHT: 251.4 LBS | DIASTOLIC BLOOD PRESSURE: 86 MMHG | HEIGHT: 71 IN | SYSTOLIC BLOOD PRESSURE: 138 MMHG | BODY MASS INDEX: 35.19 KG/M2 | HEART RATE: 64 BPM

## 2017-12-07 DIAGNOSIS — I40.9 ACUTE MYOCARDITIS, UNSPECIFIED MYOCARDITIS TYPE: Primary | ICD-10-CM

## 2017-12-07 PROCEDURE — 99213 OFFICE O/P EST LOW 20 MIN: CPT | Performed by: INTERNAL MEDICINE

## 2017-12-07 NOTE — PROGRESS NOTES
REFERRING PROVIDER:  Dr. Hugo Vegas.      HISTORY OF PRESENT ILLNESS:  Mr. Rose is a pleasant 32-year-old male who had a history of chest pains and was diagnosed with pericarditis.  He had borderline low LV systolic function with estimated EF around 50%-55%.  He was treated with high-dose aspirin and has had resolution of his chest pain symptoms.  His last echocardiogram suggested continued borderline low LV systolic function, and therefore I asked him to undergo a cardiac MRI for more accurate measurement and to determine if there is evidence of continuing inflammation.  He had this done on 11/17.  The results show that his LVEF is estimated around 55%.  There were no regional wall motion abnormalities.  Thickness and cavity size was normal.  RV function appeared normal.  There was no evidence of inflammation, fibrosis or infiltrative disease.  I did share these results with him today.  He is planning to return to go to his job in [place] on Tuesday.  He has no health concerns today.       PHYSICAL EXAMINATION:  His blood pressure was measured at 138/86, pulse is 64, weight 251.  He notes he has lost about 10 pounds -- this was intentional -- body mass index of 35.  His physical exam findings are otherwise unchanged.       ASSESSMENT AND PLAN:  In summary, Mr. Rose is a pleasant 32-year-old male with a history of pericarditis.  He has been successfully treated with high-dose aspirin with resolution of his symptoms.  Cardiac MRI does not suggest any ongoing LV dysfunction, inflammation, with normal EF.  At this time I do not think he requires any further therapy.  I will refer him back to his primary care provider and be happy to see him back in the future for any of his cardiovascular needs.  Please feel free to contact me with any questions you have in regards to his care.      cc:   Hugo Vegas MD    [facility]    [address] [place]  [address]         ROLAND LOVE,               D: 2017 09:27   T: 2017 10:28   MT: MONO      Name:     RAUDEL GLEZ   MRN:      [id number]        Account:      [id number]   :      1985           Service Date: 2017      Document: [id number]

## 2017-12-07 NOTE — LETTER
12/7/2017      Hugo Vegas MD  [facility] [address] [address] [address]      RE: Nick LAST [place]       Dear Colleague,    I had the pleasure of seeing Nick Rose in the [facility].    REFERRING PROVIDER:  Dr. Hugo Vegas.      HISTORY OF PRESENT ILLNESS:  Mr. Rose is a pleasant 32-year-old male who had a history of chest pains and was diagnosed with pericarditis.  He had borderline low LV systolic function with estimated EF around 50%-55%.  He was treated with high-dose aspirin and has had resolution of his chest pain symptoms.  His last echocardiogram suggested continued borderline low LV systolic function, and therefore I asked him to undergo a cardiac MRI for more accurate measurement and to determine if there is evidence of continuing inflammation.  He had this done on 11/17.  The results show that his LVEF is estimated around 55%.  There were no regional wall motion abnormalities.  Thickness and cavity size was normal.  RV function appeared normal.  There was no evidence of inflammation, fibrosis or infiltrative disease.  I did share these results with him today.  He is planning to return to go to his job in [place] on Tuesday.  He has no health concerns today.       PHYSICAL EXAMINATION:  His blood pressure was measured at 138/86, pulse is 64, weight 251.  He notes he has lost about 10 pounds -- this was intentional -- body mass index of 35.  His physical exam findings are otherwise unchanged.       ASSESSMENT AND PLAN:  In summary, Mr. Rose is a pleasant 32-year-old male with a history of pericarditis.  He has been successfully treated with high-dose aspirin with resolution of his symptoms.  Cardiac MRI does not suggest any ongoing LV dysfunction, inflammation, with normal EF.  At this time I do not think he requires any further therapy.  I will refer him back to his primary care provider and be happy to see him back in the  future for any of his cardiovascular needs.  Please feel free to contact me with any questions you have in regards to his care.        Outpatient Encounter Prescriptions as of 12/7/2017   Medication Sig Dispense Refill     testosterone cypionate (DEPOTESTOTERONE) 200 MG/ML injection Inject 300 mg into the muscle Per pt, every 10 days       No facility-administered encounter medications on file as of 12/7/2017.        Again, thank you for allowing me to participate in the care of your patient.      Sincerely,    Maylin Miles,      [facility]

## 2017-12-07 NOTE — MR AVS SNAPSHOT
"              After Visit Summary   2017    Nick Rose    MRN: [id number]           Patient Information     Date Of Birth          1985        Visit Information        Provider Department      2017 9:15 AM Maylin Miles DO [facility]        Today's Diagnoses     Acute myocarditis, unspecified myocarditis type    -  1       Follow-ups after your visit        Who to contact     If you have questions or need follow up information about today's clinic visit or your schedule please contact [facility] directly at [phone].  Normal or non-critical lab and imaging results will be communicated to you by [email or web address], letter or phone within 4 business days after the clinic has received the results. If you do not hear from us within 7 days, please contact the clinic through [email or web address] or phone. If you have a critical or abnormal lab result, we will notify you by phone as soon as possible.  Submit refill requests through [employer] or call your pharmacy and they will forward the refill request to us. Please allow 3 business days for your refill to be completed.          Additional Information About Your Visit        MyChart Information     [employer] lets you send messages to your doctor, view your test results, renew your prescriptions, schedule appointments and more. To sign up, go to [email or web address] . Click on \"Log in\" on the left side of the screen, which will take you to the Welcome page. Then click on \"Sign up Now\" on the right side of the page.     You will be asked to enter the access code listed below, as well as some personal information. Please follow the directions to create your username and password.     Your access code is: [email or web address]  Expires: 3/7/2018  9:28 AM     Your access code will  in 90 days. If you need help or a new code, please call your [place] clinic or [phone].      " "  Care EveryWhere ID     This is your Care EveryWhere ID. This could be used by other organizations to access your [place] medical records  [id number]        Your Vitals Were     Pulse Height BMI (Body Mass Index)             64 1.803 m (5' 11\") 35.06 kg/m2          Blood Pressure from Last 3 Encounters:   12/07/17 138/86   10/05/17 138/82   08/16/17 125/85    Weight from Last 3 Encounters:   12/07/17 114 kg (251 lb 6.4 oz)   10/05/17 118.1 kg (260 lb 6.4 oz)   08/16/17 117.8 kg (259 lb 12.8 oz)              Today, you had the following     No orders found for display       Primary Care Provider Office Phone # Fax #    Hugo Vegas [phone]       [facility] [address] [facility] [address] [address]        Equal Access to Services     JUDE [facility] HospitalSUMIT : Hadii aad ricardo hadasho Soomaali, waaxda luqadaha, qaybta kaalmada adeegyada, waxay nenitain hayazeemn charity stone . So [facility] [phone].    ATENCIÓN: Si habla español, tiene a grant disposición servicios gratuitos de asistencia lingüística. Corina al [phone].    We comply with applicable federal civil rights laws and [place] laws. We do not discriminate on the basis of race, color, national origin, age, disability, sex, sexual orientation, or gender identity.            Thank you!     Thank you for choosing [facility]  for your care. Our goal is always to provide you with excellent care. Hearing back from our patients is one way we can continue to improve our services. Please take a few minutes to complete the written survey that you may receive in the mail after your visit with us. Thank you!             Your Updated Medication List - Protect others around you: Learn how to safely use, store and throw away your medicines at www.disposemymeds.org.          This list is accurate as of: 12/7/17  9:28 AM.  Always use your most recent med list.                   Brand Name " Dispense Instructions for use Diagnosis    testosterone cypionate 200 MG/ML injection    DEPOTESTOTERONE     Inject 300 mg into the muscle Per pt, every 10 days

## 2017-12-07 NOTE — PROGRESS NOTES
"HPI and Plan:   See dictation    No orders of the defined types were placed in this encounter.      No orders of the defined types were placed in this encounter.      There are no discontinued medications.      No diagnosis found.    CURRENT MEDICATIONS:  Current Outpatient Prescriptions   Medication Sig Dispense Refill     testosterone cypionate (DEPOTESTOTERONE) 200 MG/ML injection Inject 300 mg into the muscle Per pt, every 10 days         ALLERGIES   No Known Allergies    PAST MEDICAL HISTORY:  Past Medical History:   Diagnosis Date     Acute myopericarditis 10/4/2017     Chest pain 7/31/2017     Hypogonadism male 10/4/2017     NO ACTIVE PROBLEMS        PAST SURGICAL HISTORY:  Past Surgical History:   Procedure Laterality Date     ELBOW SURGERY      arthroscopic surgery       FAMILY HISTORY:  Family History   Problem Relation Age of Onset     Coronary Artery Disease Father      In his 60s       SOCIAL HISTORY:  Social History     Social History     Marital status: Single     Spouse name: N/A     Number of children: N/A     Years of education: N/A     Social History Main Topics     Smoking status: Never Smoker     Smokeless tobacco: Never Used     Alcohol use No     Drug use: No     Sexual activity: Not Currently     Other Topics Concern     None     Social History Narrative       Review of Systems:  Skin:  Negative       Eyes:  Negative      ENT:  Negative      Respiratory:  Negative       Cardiovascular:  Negative      Gastroenterology: Negative      Genitourinary:  Negative      Musculoskeletal:         Neurologic:  Negative      Psychiatric:  Negative      Heme/Lymph/Imm:  Negative      Endocrine:  Negative        Physical Exam:  Vitals: /86 (BP Location: Right arm, Patient Position: Sitting, Cuff Size: Adult Regular)  Pulse 64  Ht 1.803 m (5' 11\")  Wt 114 kg (251 lb 6.4 oz)  BMI 35.06 kg/m2    Constitutional:  cooperative, alert and oriented, well developed, well nourished, in no acute distress    "     Skin:  warm and dry to the touch          Head:  normocephalic        Eyes:  pupils equal and round        Lymph:      ENT:  no pallor or cyanosis        Neck:           Respiratory:  normal respiratory excursion         Cardiac: regular rhythm                                                         GI:           Extremities and Muscular Skeletal:  no edema              Neurological:  no gross motor deficits        Psych:  Alert and Oriented x 3          CC  No referring provider defined for this encounter.

## 2019-07-29 ENCOUNTER — TRANSFERRED RECORDS (OUTPATIENT)
Dept: HEALTH INFORMATION MANAGEMENT | Facility: CLINIC | Age: 34
End: 2019-07-29

## 2020-06-19 ENCOUNTER — TELEPHONE (OUTPATIENT)
Dept: FAMILY MEDICINE | Facility: OTHER | Age: 35
End: 2020-06-19

## 2020-06-19 NOTE — TELEPHONE ENCOUNTER
"Pt called, requesting appt for \"lump behind left testicle.\" PCP not a [facility] provider. Pt was advised by scheduling that he would need to establish care first and these appts are booked out until July. Pt irritable, stating that he will not wait until the end of July to be seen. Pt was advised that he should go to [facility] if he feels that he needs to be seen sooner. Pt is argumentative and angry with this writer. He does agree to go to [facility] for eval.   "

## 2020-06-26 NOTE — PROGRESS NOTES
Subjective     Nick Rose is a 35 year old male who presents to clinic today for the following health issues:    HPI   New Patient/Transfer of Care  Scrotum      Duration: 10 days    Description (location/character/radiation):     Intensity:  mild    Accompanying signs and symptoms: swelling    History (similar episodes/previous evaluation): None    Precipitating or alleviating factors: None    Therapies tried and outcome: None    Patient had no dysuria or urethral discharge.  He did slip and stretched his leg awkwardly and was after this that he noted a lump between the proximal left thigh medially and his scrotum.  He did not feel the lump on his testicle.  He has been monitoring it and the lump is basically almost cleared.  No other problem       Patient Active Problem List   Diagnosis     Chest pain     Acute myopericarditis     Hypogonadism male     Past Surgical History:   Procedure Laterality Date     ELBOW SURGERY      arthroscopic surgery       Social History     Tobacco Use     Smoking status: Never Smoker     Smokeless tobacco: Never Used   Substance Use Topics     Alcohol use: Yes     Frequency: 2-3 times a week     Family History   Problem Relation Age of Onset     Coronary Artery Disease Father         In his 60s         No current outpatient medications on file.     No Known Allergies      Reviewed and updated as needed this visit by Provider         Review of Systems   Constitutional, HEENT, cardiovascular, pulmonary, gi and gu systems are negative, except as otherwise noted.      Objective    There were no vitals taken for this visit.  There is no height or weight on file to calculate BMI.  Physical Exam   GENERAL: healthy, alert and no distress  EYES: Eyes grossly normal to inspection, PERRL and conjunctivae and sclerae normal  RESP: Breathing comfortably   (male): normal male genitalia without lesions or urethral discharge, no hernia, the area is right near the junction of the proximal  "left thigh and the scrotum there is no definitive mass there I do not feel any evidence of any lymphadenopathy in the area there is no evidence of infection.  And both testicles palpated and no evidence of testicular masses  MS: no gross musculoskeletal defects noted, no edema    Diagnostic Test Results:  none         Assessment & Plan       ICD-10-CM    1. Inguinal strain, left, initial encounter  S76.212A    The fact that this came on right after he slipped having hyperflexed his left thigh and it was the size of a grape  and over the last several days now the swelling has receded and is basically back to normal is reassuring.  There is no sign of infection there is no evidence of testicle involvement.  He will not check it for the next 2 weeks and then check it and if there is any evidence of swelling or he has a sense of fullness there he will let us know and we will get an ultrasound and have him see urology but at this point I do not think that is necessary.     BMI:   Estimated body mass index is 29.37 kg/m  as calculated from the following:    Height as of this encounter: 1.905 m (6' 3\").    Weight as of this encounter: 106.6 kg (235 lb).     SUMIT Watson MD  [facility] [place]        "

## 2020-06-30 ENCOUNTER — OFFICE VISIT (OUTPATIENT)
Dept: FAMILY MEDICINE | Facility: OTHER | Age: 35
End: 2020-06-30
Attending: FAMILY MEDICINE
Payer: COMMERCIAL

## 2020-06-30 VITALS
HEIGHT: 75 IN | SYSTOLIC BLOOD PRESSURE: 116 MMHG | BODY MASS INDEX: 29.22 KG/M2 | WEIGHT: 235 LBS | DIASTOLIC BLOOD PRESSURE: 76 MMHG | HEART RATE: 63 BPM | TEMPERATURE: 97.4 F | OXYGEN SATURATION: 98 %

## 2020-06-30 DIAGNOSIS — S76.212A INGUINAL STRAIN, LEFT, INITIAL ENCOUNTER: Primary | ICD-10-CM

## 2020-06-30 PROCEDURE — 99203 OFFICE O/P NEW LOW 30 MIN: CPT | Performed by: FAMILY MEDICINE

## 2020-06-30 SDOH — HEALTH STABILITY: MENTAL HEALTH: HOW OFTEN DO YOU HAVE A DRINK CONTAINING ALCOHOL?: 2-3 TIMES A WEEK

## 2020-06-30 ASSESSMENT — PAIN SCALES - GENERAL: PAINLEVEL: NO PAIN (0)

## 2020-06-30 ASSESSMENT — MIFFLIN-ST. JEOR: SCORE: 2076.58

## 2020-06-30 NOTE — NURSING NOTE
"Chief Complaint   Patient presents with     Establish Care       Initial /76   Pulse 63   Temp 97.4  F (36.3  C) (Tympanic)   Ht 1.905 m (6' 3\")   Wt 106.6 kg (235 lb)   SpO2 98%   BMI 29.37 kg/m   Estimated body mass index is 29.37 kg/m  as calculated from the following:    Height as of this encounter: 1.905 m (6' 3\").    Weight as of this encounter: 106.6 kg (235 lb).  Medication Reconciliation: complete  Kat Luque LPN  "

## 2021-11-09 ENCOUNTER — ALLIED HEALTH/NURSE VISIT (OUTPATIENT)
Dept: FAMILY MEDICINE | Facility: OTHER | Age: 36
End: 2021-11-09
Attending: FAMILY MEDICINE
Payer: COMMERCIAL

## 2021-11-09 DIAGNOSIS — Z20.822 COVID-19 RULED OUT: Primary | ICD-10-CM

## 2021-11-09 PROCEDURE — U0003 INFECTIOUS AGENT DETECTION BY NUCLEIC ACID (DNA OR RNA); SEVERE ACUTE RESPIRATORY SYNDROME CORONAVIRUS 2 (SARS-COV-2) (CORONAVIRUS DISEASE [COVID-19]), AMPLIFIED PROBE TECHNIQUE, MAKING USE OF HIGH THROUGHPUT TECHNOLOGIES AS DESCRIBED BY CMS-2020-01-R: HCPCS | Mod: ZL

## 2021-11-09 PROCEDURE — C9803 HOPD COVID-19 SPEC COLLECT: HCPCS

## 2021-11-11 LAB — SARS-COV-2 RNA RESP QL NAA+PROBE: NEGATIVE

## 2021-12-04 ENCOUNTER — HEALTH MAINTENANCE LETTER (OUTPATIENT)
Age: 36
End: 2021-12-04

## 2022-04-25 ASSESSMENT — PATIENT HEALTH QUESTIONNAIRE - PHQ9
10. IF YOU CHECKED OFF ANY PROBLEMS, HOW DIFFICULT HAVE THESE PROBLEMS MADE IT FOR YOU TO DO YOUR WORK, TAKE CARE OF THINGS AT HOME, OR GET ALONG WITH OTHER PEOPLE: SOMEWHAT DIFFICULT
SUM OF ALL RESPONSES TO PHQ QUESTIONS 1-9: 16
SUM OF ALL RESPONSES TO PHQ QUESTIONS 1-9: 16

## 2022-05-02 ENCOUNTER — OFFICE VISIT (OUTPATIENT)
Dept: FAMILY MEDICINE | Facility: OTHER | Age: 37
End: 2022-05-02
Attending: INTERNAL MEDICINE
Payer: COMMERCIAL

## 2022-05-02 VITALS
WEIGHT: 263 LBS | TEMPERATURE: 98.1 F | HEART RATE: 80 BPM | DIASTOLIC BLOOD PRESSURE: 80 MMHG | BODY MASS INDEX: 33.75 KG/M2 | OXYGEN SATURATION: 98 % | RESPIRATION RATE: 16 BRPM | HEIGHT: 74 IN | SYSTOLIC BLOOD PRESSURE: 120 MMHG

## 2022-05-02 DIAGNOSIS — Z23 ENCOUNTER FOR IMMUNIZATION: ICD-10-CM

## 2022-05-02 DIAGNOSIS — R79.89 LOW TESTOSTERONE IN MALE: ICD-10-CM

## 2022-05-02 DIAGNOSIS — E66.09 CLASS 1 OBESITY DUE TO EXCESS CALORIES WITHOUT SERIOUS COMORBIDITY WITH BODY MASS INDEX (BMI) OF 34.0 TO 34.9 IN ADULT: ICD-10-CM

## 2022-05-02 DIAGNOSIS — E66.811 CLASS 1 OBESITY DUE TO EXCESS CALORIES WITHOUT SERIOUS COMORBIDITY WITH BODY MASS INDEX (BMI) OF 34.0 TO 34.9 IN ADULT: ICD-10-CM

## 2022-05-02 DIAGNOSIS — Z00.00 ANNUAL PHYSICAL EXAM: Primary | ICD-10-CM

## 2022-05-02 DIAGNOSIS — R53.83 FATIGUE, UNSPECIFIED TYPE: ICD-10-CM

## 2022-05-02 DIAGNOSIS — F41.9 ANXIETY: ICD-10-CM

## 2022-05-02 DIAGNOSIS — L98.9 SKIN LESION: ICD-10-CM

## 2022-05-02 DIAGNOSIS — G44.219 EPISODIC TENSION-TYPE HEADACHE, NOT INTRACTABLE: ICD-10-CM

## 2022-05-02 LAB
ALBUMIN SERPL-MCNC: 4.5 G/DL (ref 3.5–5.7)
ALP SERPL-CCNC: 49 U/L (ref 34–104)
ALT SERPL W P-5'-P-CCNC: 27 U/L (ref 7–52)
ANION GAP SERPL CALCULATED.3IONS-SCNC: 8 MMOL/L (ref 3–14)
AST SERPL W P-5'-P-CCNC: 18 U/L (ref 13–39)
BASOPHILS # BLD AUTO: 0.1 10E3/UL (ref 0–0.2)
BASOPHILS NFR BLD AUTO: 1 %
BILIRUB SERPL-MCNC: 0.4 MG/DL (ref 0.3–1)
BUN SERPL-MCNC: 11 MG/DL (ref 7–25)
CALCIUM SERPL-MCNC: 9.5 MG/DL (ref 8.6–10.3)
CHLORIDE BLD-SCNC: 102 MMOL/L (ref 98–107)
CHOLEST SERPL-MCNC: 194 MG/DL
CO2 SERPL-SCNC: 30 MMOL/L (ref 21–31)
CREAT SERPL-MCNC: 0.91 MG/DL (ref 0.7–1.3)
EOSINOPHIL # BLD AUTO: 0.1 10E3/UL (ref 0–0.7)
EOSINOPHIL NFR BLD AUTO: 1 %
ERYTHROCYTE [DISTWIDTH] IN BLOOD BY AUTOMATED COUNT: 13 % (ref 10–15)
FASTING STATUS PATIENT QL REPORTED: YES
GFR SERPL CREATININE-BSD FRML MDRD: >90 ML/MIN/1.73M2
GLUCOSE BLD-MCNC: 106 MG/DL (ref 70–105)
HBA1C MFR BLD: 5.5 % (ref 4–6.2)
HCT VFR BLD AUTO: 45.9 % (ref 40–53)
HDLC SERPL-MCNC: 44 MG/DL (ref 23–92)
HGB BLD-MCNC: 15.6 G/DL (ref 13.3–17.7)
IMM GRANULOCYTES # BLD: 0 10E3/UL
IMM GRANULOCYTES NFR BLD: 0 %
LDLC SERPL CALC-MCNC: 133 MG/DL
LYMPHOCYTES # BLD AUTO: 2.3 10E3/UL (ref 0.8–5.3)
LYMPHOCYTES NFR BLD AUTO: 36 %
MCH RBC QN AUTO: 30.6 PG (ref 26.5–33)
MCHC RBC AUTO-ENTMCNC: 34 G/DL (ref 31.5–36.5)
MCV RBC AUTO: 90 FL (ref 78–100)
MONOCYTES # BLD AUTO: 0.4 10E3/UL (ref 0–1.3)
MONOCYTES NFR BLD AUTO: 6 %
NEUTROPHILS # BLD AUTO: 3.6 10E3/UL (ref 1.6–8.3)
NEUTROPHILS NFR BLD AUTO: 56 %
NONHDLC SERPL-MCNC: 150 MG/DL
NRBC # BLD AUTO: 0 10E3/UL
NRBC BLD AUTO-RTO: 0 /100
PLATELET # BLD AUTO: 278 10E3/UL (ref 150–450)
POTASSIUM BLD-SCNC: 4.1 MMOL/L (ref 3.5–5.1)
PROT SERPL-MCNC: 7.4 G/DL (ref 6.4–8.9)
RBC # BLD AUTO: 5.1 10E6/UL (ref 4.4–5.9)
SODIUM SERPL-SCNC: 140 MMOL/L (ref 134–144)
TRIGL SERPL-MCNC: 84 MG/DL
TSH SERPL DL<=0.005 MIU/L-ACNC: 0.77 MU/L (ref 0.4–4)
WBC # BLD AUTO: 6.4 10E3/UL (ref 4–11)

## 2022-05-02 PROCEDURE — 83036 HEMOGLOBIN GLYCOSYLATED A1C: CPT | Mod: ZL | Performed by: FAMILY MEDICINE

## 2022-05-02 PROCEDURE — 90471 IMMUNIZATION ADMIN: CPT | Performed by: FAMILY MEDICINE

## 2022-05-02 PROCEDURE — 99214 OFFICE O/P EST MOD 30 MIN: CPT | Mod: 25 | Performed by: FAMILY MEDICINE

## 2022-05-02 PROCEDURE — 84403 ASSAY OF TOTAL TESTOSTERONE: CPT | Mod: ZL | Performed by: FAMILY MEDICINE

## 2022-05-02 PROCEDURE — 80053 COMPREHEN METABOLIC PANEL: CPT | Mod: ZL | Performed by: FAMILY MEDICINE

## 2022-05-02 PROCEDURE — 36415 COLL VENOUS BLD VENIPUNCTURE: CPT | Mod: ZL | Performed by: FAMILY MEDICINE

## 2022-05-02 PROCEDURE — 84443 ASSAY THYROID STIM HORMONE: CPT | Mod: ZL | Performed by: FAMILY MEDICINE

## 2022-05-02 PROCEDURE — 99385 PREV VISIT NEW AGE 18-39: CPT | Mod: 25 | Performed by: FAMILY MEDICINE

## 2022-05-02 PROCEDURE — 90715 TDAP VACCINE 7 YRS/> IM: CPT | Performed by: FAMILY MEDICINE

## 2022-05-02 PROCEDURE — 85025 COMPLETE CBC W/AUTO DIFF WBC: CPT | Mod: ZL | Performed by: FAMILY MEDICINE

## 2022-05-02 PROCEDURE — 80061 LIPID PANEL: CPT | Mod: ZL | Performed by: FAMILY MEDICINE

## 2022-05-02 RX ORDER — NALTREXONE HYDROCHLORIDE 50 MG/1
12.5 TABLET, FILM COATED ORAL DAILY
COMMUNITY
Start: 2022-04-18 | End: 2022-08-01

## 2022-05-02 ASSESSMENT — ANXIETY QUESTIONNAIRES
5. BEING SO RESTLESS THAT IT IS HARD TO SIT STILL: NOT AT ALL
3. WORRYING TOO MUCH ABOUT DIFFERENT THINGS: SEVERAL DAYS
6. BECOMING EASILY ANNOYED OR IRRITABLE: SEVERAL DAYS
IF YOU CHECKED OFF ANY PROBLEMS ON THIS QUESTIONNAIRE, HOW DIFFICULT HAVE THESE PROBLEMS MADE IT FOR YOU TO DO YOUR WORK, TAKE CARE OF THINGS AT HOME, OR GET ALONG WITH OTHER PEOPLE: SOMEWHAT DIFFICULT
1. FEELING NERVOUS, ANXIOUS, OR ON EDGE: SEVERAL DAYS
7. FEELING AFRAID AS IF SOMETHING AWFUL MIGHT HAPPEN: NOT AT ALL
2. NOT BEING ABLE TO STOP OR CONTROL WORRYING: NOT AT ALL
GAD7 TOTAL SCORE: 4

## 2022-05-02 ASSESSMENT — PAIN SCALES - GENERAL: PAINLEVEL: NO PAIN (0)

## 2022-05-02 ASSESSMENT — PATIENT HEALTH QUESTIONNAIRE - PHQ9: 5. POOR APPETITE OR OVEREATING: SEVERAL DAYS

## 2022-05-02 NOTE — NURSING NOTE
"Chief Complaint   Patient presents with     Physical     Fasting for labs    Initial /80   Pulse 80   Temp 98.1  F (36.7  C) (Tympanic)   Resp 16   Ht 1.867 m (6' 1.5\")   Wt 119.3 kg (263 lb)   SpO2 98%   BMI 34.23 kg/m   Estimated body mass index is 34.23 kg/m  as calculated from the following:    Height as of this encounter: 1.867 m (6' 1.5\").    Weight as of this encounter: 119.3 kg (263 lb).         Norma J. Gosselin, LPN   "

## 2022-05-02 NOTE — PROGRESS NOTES
SUBJECTIVE:   CC: Nick Rose is an 37 year old male who presents for preventative health visit.     Patient has been advised of split billing requirements and indicates understanding: Yes  Healthy Habits:     Getting at least 3 servings of Calcium per day:  NO    Bi-annual eye exam:  NO    Dental care twice a year:  NO    Sleep apnea or symptoms of sleep apnea:  Daytime drowsiness    Diet:  Regular (no restrictions)    Frequency of exercise:  1 day/week    Duration of exercise:  15-30 minutes    Taking medications regularly:  Yes    Medication side effects:  None    PHQ-2 Total Score: 2    Additional concerns today:  No    Headaches:  He reports noticing them upon waking up in the morning.  He reports that pain is located across his forehead.  Improved with hydration and Excedrin.  Episodes have been becoming more frequent over the past couple of months.  He denies snoring.  He denies any episodes of apnea.  He does have daytime drowsiness.  He denies any vision changes.    Anxiety:  Believes his symptoms are secondary to stress.  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  He tries to distract himself when he is stressed, primarily working in his garage or doing work on his property.  Sometimes the list of things he notices needs to be completed adds to his stress.  Some days he has difficulty completing tasks.  He does not feel he is depressed.    He reports a history of low testosterone levels and has been on TRT in the past with a physician in [place].    He has been taking an appetite suppressant and a medication for cravings:  Bupropion 150 mg daily and Naltrexone 12.5 mg daily, prescribed through a weight loss program.    He has a couple of skin spots that he would like evaluated.    Today's PHQ-2 Score:   PHQ-2 ( 1999 Pfizer) 4/25/2022   Q1: Little interest or pleasure in doing things 3   Q2: Feeling down, depressed or hopeless 1   PHQ-2 Score 4   Q1:  Little interest or pleasure in doing things Nearly every day   Q2: Feeling down, depressed or hopeless Several days   PHQ-2 Score 4     Abuse: Current or Past(Physical, Sexual or Emotional)- No  Do you feel safe in your environment? Yes    Have you ever done Advance Care Planning? (For example, a Health Directive, POLST, or a discussion with a medical provider or your loved ones about your wishes): No, advance care planning information given to patient to review.  Patient declined advance care planning discussion at this time.    Social History     Tobacco Use     Smoking status: Never Smoker     Smokeless tobacco: Never Used   Substance Use Topics     Alcohol use: Yes     Alcohol/week: 3.0 standard drinks     Types: 3 Cans of beer per week     Alcohol Use 4/25/2022   Prescreen: >3 drinks/day or >7 drinks/week? Yes   AUDIT SCORE  4     AUDIT - Alcohol Use Disorders Identification Test - Reproduced from the World Health Organization Audit 2001 (Second Edition) 4/25/2022   1.  How often do you have a drink containing alcohol? 2 to 3 times a week   2.  How many drinks containing alcohol do you have on a typical day when you are drinking? 1 or 2   3.  How often do you have five or more drinks on one occasion? Less than monthly   4.  How often during the last year have you found that you were not able to stop drinking once you had started? Never   5.  How often during the last year have you failed to do what was normally expected of you because of drinking? Never   6.  How often during the last year have you needed a first drink in the morning to get yourself going after a heavy drinking session? Never   7.  How often during the last year have you had a feeling of guilt or remorse after drinking? Never   8.  How often during the last year have you been unable to remember what happened the night before because of your drinking? Never   9.  Have you or someone else been injured because of your drinking? No   10. Has a  "relative, friend, doctor or other health care worker been concerned about your drinking or suggested you cut down? No   TOTAL SCORE 4     Reviewed orders with patient. Reviewed health maintenance and updated orders accordingly - Yes  Lab work is in process    Reviewed and updated as needed this visit by clinical staff   Tobacco   Meds   Med Hx  Surg Hx  Fam Hx  Soc Hx        Reviewed and updated as needed this visit by Provider                   Past Medical History:   Diagnosis Date     Acute myopericarditis 10/4/2017     Chest pain 7/31/2017     Hypogonadism male 10/4/2017     NO ACTIVE PROBLEMS       Past Surgical History:   Procedure Laterality Date     ELBOW SURGERY      arthroscopic surgery     Review of Systems  CONSTITUTIONAL: NEGATIVE for fever, chills, change in weight  INTEGUMENTARY/SKIN: NEGATIVE except as noted in HPI  EYES: NEGATIVE for vision changes or irritation  ENT: NEGATIVE for ear, mouth and throat problems  RESP: NEGATIVE for significant cough or SOB  CV: NEGATIVE for chest pain, palpitations or peripheral edema  GI: NEGATIVE for nausea, abdominal pain, heartburn, or change in bowel habits   male: negative for dysuria, hematuria, decreased urinary stream, erectile dysfunction, urethral discharge  MUSCULOSKELETAL: NEGATIVE for significant arthralgias or myalgia  NEURO: NEGATIVE except as noted in HPI  PSYCHIATRIC: NEGATIVE except as noted in HPI    OBJECTIVE:   /80   Pulse 80   Temp 98.1  F (36.7  C) (Tympanic)   Resp 16   Ht 1.867 m (6' 1.5\")   Wt 119.3 kg (263 lb)   SpO2 98%   BMI 34.23 kg/m      Physical Exam  GENERAL: healthy, alert and no distress  EYES: Eyes grossly normal to inspection, PERRL and conjunctivae and sclerae normal  HENT: ear canals and TM's normal, nose and mouth without ulcers or lesions  NECK: no adenopathy, no asymmetry, masses, or scars and thyroid normal to palpation  RESP: lungs clear to auscultation - no rales, rhonchi or wheezes  CV: regular rate " and rhythm, normal S1 S2, no S3 or S4, no murmur, click or rub, no peripheral edema and peripheral pulses strong  ABDOMEN: soft, nontender, no hepatosplenomegaly, no masses and bowel sounds normal  MS: no gross musculoskeletal defects noted, no edema  SKIN: no suspicious lesions or rashes  NEURO: Normal strength and tone, mentation intact and speech normal  PSYCH: affect normal/bright    Diagnostic Test Results:  Labs reviewed in Epic    ASSESSMENT/PLAN:   (Z00.00) Annual physical exam  (primary encounter diagnosis)  No indication for daily ASA.  BP at goal < 130/80.  BMI > 30.0.  Check lipid panel for ASCVD risk assessment.  He declines HIV and Hepatitis C screening.  No indication for early colon cancer screening.  Tdap administered today.  He declines COVID-19 vaccination.  Remainder of immunizations up-to-date.  Counseled on healthy diet and exercise.  No depression.  Anxiety management as detailed below.    (E66.09,  Z68.34) Class 1 obesity due to excess calories without serious comorbidity with body mass index (BMI) of 34.0 to 34.9 in adult  Comment: Check laboratory studies for evaluation of metabolic syndrome.  Counseled on healthy diet and exercise.  Plan: CBC and Differential, Comprehensive Metabolic         Panel, Lipid Panel, Hemoglobin A1c    (R53.83) Fatigue, unspecified type  Comment: History of low testosterone.  Will check testosterone level and TSH for further evaluation.  Plan: TSH Reflex GH, Testosterone, Total    (R79.89) Low testosterone in male  Plan: Testosterone, Total    (F41.9) Anxiety  Discussed management.  He reports that his symptoms are secondary to stress and does not desire any medication at this time.  Encouraged follow-up if he reconsiders.    (G44.219) Episodic tension-type headache, not intractable  Encouraged him to keep a journal with details on his diet, water intake, caffeine consumption, sleep, exercise, and screen time to determine triggers for elimination.  Continue  "Tylenol or Ibuprofen as needed for relief.  Follow-up if headaches worsening.    (L98.9) Skin lesion  Encouraged continued observation.  Follow-up with any changes.  He can make an appointment for biopsy if he would like lesion removed.    (Z23) Encounter for immunization  Plan: TDAP VACCINE (Adacel, Boostrix)  [[id number]]    Patient has been advised of split billing requirements and indicates understanding: Yes    COUNSELING:   Reviewed preventive health counseling, as reflected in patient instructions       Regular exercise       Healthy diet/nutrition       Vision screening       Hearing screening       Immunizations       Alcohol Use        Safe sex practices/STD prevention       Consider Hep C screening for all patients one time for ages 18-79 years       HIV screeninx in teen years, 1x in adult years, and at intervals if high risk       Colorectal cancer screening       Osteoporosis prevention/bone health    Estimated body mass index is 34.23 kg/m  as calculated from the following:    Height as of this encounter: 1.867 m (6' 1.5\").    Weight as of this encounter: 119.3 kg (263 lb).     Weight management plan: Discussed healthy diet and exercise guidelines    He reports that he has never smoked. He has never used smokeless tobacco.    Counseling Resources:  ATP IV Guidelines  Pooled Cohorts Equation Calculator  FRAX Risk Assessment  ICSI Preventive Guidelines  Dietary Guidelines for Americans, 2010  USDA's MyPlate  ASA Prophylaxis  Lung CA Screening    [facility]  Answers for HPI/ROS submitted by the patient on 2022  If you checked off any problems, how difficult have these problems made it for you to do your work, take care of things at home, or get along with other people?: Somewhat difficult  PHQ9 TOTAL SCORE: 16      "

## 2022-05-03 ASSESSMENT — ANXIETY QUESTIONNAIRES: GAD7 TOTAL SCORE: 4

## 2022-05-04 LAB — TESTOST SERPL-MCNC: 306 NG/DL (ref 240–950)

## 2022-08-01 ENCOUNTER — E-VISIT (OUTPATIENT)
Dept: URGENT CARE | Facility: CLINIC | Age: 37
End: 2022-08-01
Payer: COMMERCIAL

## 2022-08-01 DIAGNOSIS — L30.9 DERMATITIS: Primary | ICD-10-CM

## 2022-08-01 PROCEDURE — 99421 OL DIG E/M SVC 5-10 MIN: CPT | Performed by: EMERGENCY MEDICINE

## 2022-08-01 RX ORDER — BETAMETHASONE DIPROPIONATE 0.5 MG/G
LOTION TOPICAL 2 TIMES DAILY
Qty: 860 ML | Refills: 1 | Status: SHIPPED | OUTPATIENT
Start: 2022-08-01 | End: 2024-04-03

## 2022-08-01 NOTE — PATIENT INSTRUCTIONS
Dear Nick Rose    I have ordered a stronger steroid cream to be used.  If this does not take the rash away in 1 week then contact your PCP or come into urgent care to have it further evaluated.  Come in sooner if it gets worse.    Thanks for choosing us as your health care partner,    Yves Barlow MD

## 2022-09-17 ENCOUNTER — HEALTH MAINTENANCE LETTER (OUTPATIENT)
Age: 37
End: 2022-09-17

## 2022-11-10 ENCOUNTER — E-VISIT (OUTPATIENT)
Dept: URGENT CARE | Facility: CLINIC | Age: 37
End: 2022-11-10
Payer: COMMERCIAL

## 2022-11-10 DIAGNOSIS — R21 RASH: Primary | ICD-10-CM

## 2022-11-10 PROCEDURE — 99207 PR NON-BILLABLE SERV PER CHARTING: CPT | Performed by: NURSE PRACTITIONER

## 2022-11-11 NOTE — PATIENT INSTRUCTIONS
Dear Nick Rose,    We are sorry you are not feeling well. Based on the responses you provided, it is recommended that you be seen in-person in urgent care so we can better evaluate your symptoms. Please click here to find the nearest urgent care location to you.   You will not be charged for this Visit. Thank you for trusting us with your care.    Alejandrina Rosado, CNP

## 2023-07-29 ENCOUNTER — HEALTH MAINTENANCE LETTER (OUTPATIENT)
Age: 38
End: 2023-07-29

## 2024-03-07 ENCOUNTER — TELEPHONE (OUTPATIENT)
Dept: FAMILY MEDICINE | Facility: OTHER | Age: 39
End: 2024-03-07

## 2024-03-07 NOTE — TELEPHONE ENCOUNTER
New Patient Record Request:    Patient: Nick Rose  MRN: [id number]     Patient girlfriend Lizzie calling to set up this new patient appointment and she she think he was seen 8 years ago someplace in [place]     Previous Place(s) Receiving Care: [place] / [facility]    Previous PCP: Doesn't remember    *route via inbasket to Kush Elise

## 2024-03-08 NOTE — TELEPHONE ENCOUNTER
Mailed [facility] record request form to patient 3-8-24

## 2024-04-01 ASSESSMENT — PATIENT HEALTH QUESTIONNAIRE - PHQ9: SUM OF ALL RESPONSES TO PHQ QUESTIONS 1-9: 8

## 2024-04-03 ENCOUNTER — OFFICE VISIT (OUTPATIENT)
Dept: FAMILY MEDICINE | Facility: OTHER | Age: 39
End: 2024-04-03
Attending: STUDENT IN AN ORGANIZED HEALTH CARE EDUCATION/TRAINING PROGRAM
Payer: COMMERCIAL

## 2024-04-03 VITALS
DIASTOLIC BLOOD PRESSURE: 78 MMHG | WEIGHT: 237.4 LBS | TEMPERATURE: 97.6 F | BODY MASS INDEX: 29.52 KG/M2 | HEIGHT: 75 IN | HEART RATE: 72 BPM | RESPIRATION RATE: 18 BRPM | OXYGEN SATURATION: 98 % | SYSTOLIC BLOOD PRESSURE: 126 MMHG

## 2024-04-03 DIAGNOSIS — Z13.220 SCREENING FOR HYPERLIPIDEMIA: ICD-10-CM

## 2024-04-03 DIAGNOSIS — Z76.89 ENCOUNTER TO ESTABLISH CARE: Primary | ICD-10-CM

## 2024-04-03 DIAGNOSIS — Z00.00 HEALTHCARE MAINTENANCE: ICD-10-CM

## 2024-04-03 DIAGNOSIS — E29.1 HYPOGONADISM MALE: ICD-10-CM

## 2024-04-03 DIAGNOSIS — Z00.00 ROUTINE GENERAL MEDICAL EXAMINATION AT A HEALTH CARE FACILITY: ICD-10-CM

## 2024-04-03 DIAGNOSIS — Z13.1 SCREENING FOR DIABETES MELLITUS: ICD-10-CM

## 2024-04-03 DIAGNOSIS — Z11.9 SCREENING EXAMINATION FOR INFECTIOUS DISEASE: ICD-10-CM

## 2024-04-03 PROBLEM — R07.9 CHEST PAIN: Status: RESOLVED | Noted: 2017-07-31 | Resolved: 2024-04-03

## 2024-04-03 PROBLEM — R51.9 ACHING HEADACHE: Status: ACTIVE | Noted: 2022-04-01

## 2024-04-03 PROBLEM — I30.9 ACUTE MYOPERICARDITIS: Status: RESOLVED | Noted: 2017-10-04 | Resolved: 2024-04-03

## 2024-04-03 PROBLEM — R51.9 ACHING HEADACHE: Status: RESOLVED | Noted: 2022-04-01 | Resolved: 2024-04-03

## 2024-04-03 LAB
ALBUMIN SERPL BCG-MCNC: 5.1 G/DL (ref 3.5–5.2)
ALP SERPL-CCNC: 59 U/L (ref 40–150)
ALT SERPL W P-5'-P-CCNC: 22 U/L (ref 0–70)
ANION GAP SERPL CALCULATED.3IONS-SCNC: 11 MMOL/L (ref 7–15)
AST SERPL W P-5'-P-CCNC: 20 U/L (ref 0–45)
BASOPHILS # BLD AUTO: 0.1 10E3/UL (ref 0–0.2)
BASOPHILS NFR BLD AUTO: 1 %
BILIRUB SERPL-MCNC: 0.5 MG/DL
BUN SERPL-MCNC: 16.4 MG/DL (ref 6–20)
CALCIUM SERPL-MCNC: 9.8 MG/DL (ref 8.6–10)
CHLORIDE SERPL-SCNC: 101 MMOL/L (ref 98–107)
CHOLEST SERPL-MCNC: 190 MG/DL
CREAT SERPL-MCNC: 0.91 MG/DL (ref 0.67–1.17)
DEPRECATED HCO3 PLAS-SCNC: 27 MMOL/L (ref 22–29)
EGFRCR SERPLBLD CKD-EPI 2021: >90 ML/MIN/1.73M2
EOSINOPHIL # BLD AUTO: 0.1 10E3/UL (ref 0–0.7)
EOSINOPHIL NFR BLD AUTO: 1 %
ERYTHROCYTE [DISTWIDTH] IN BLOOD BY AUTOMATED COUNT: 12.7 % (ref 10–15)
EST. AVERAGE GLUCOSE BLD GHB EST-MCNC: 111 MG/DL
FASTING STATUS PATIENT QL REPORTED: YES
GLUCOSE SERPL-MCNC: 110 MG/DL (ref 70–99)
HBA1C MFR BLD: 5.5 %
HCT VFR BLD AUTO: 46.6 % (ref 40–53)
HDLC SERPL-MCNC: 50 MG/DL
HGB BLD-MCNC: 16.1 G/DL (ref 13.3–17.7)
IMM GRANULOCYTES # BLD: 0 10E3/UL
IMM GRANULOCYTES NFR BLD: 0 %
LDLC SERPL CALC-MCNC: 124 MG/DL
LYMPHOCYTES # BLD AUTO: 2.8 10E3/UL (ref 0.8–5.3)
LYMPHOCYTES NFR BLD AUTO: 38 %
MCH RBC QN AUTO: 30.7 PG (ref 26.5–33)
MCHC RBC AUTO-ENTMCNC: 34.5 G/DL (ref 31.5–36.5)
MCV RBC AUTO: 89 FL (ref 78–100)
MONOCYTES # BLD AUTO: 0.4 10E3/UL (ref 0–1.3)
MONOCYTES NFR BLD AUTO: 6 %
NEUTROPHILS # BLD AUTO: 4 10E3/UL (ref 1.6–8.3)
NEUTROPHILS NFR BLD AUTO: 54 %
NONHDLC SERPL-MCNC: 140 MG/DL
NRBC # BLD AUTO: 0 10E3/UL
NRBC BLD AUTO-RTO: 0 /100
PLATELET # BLD AUTO: 262 10E3/UL (ref 150–450)
POTASSIUM SERPL-SCNC: 4 MMOL/L (ref 3.4–5.3)
PROT SERPL-MCNC: 8.1 G/DL (ref 6.4–8.3)
RBC # BLD AUTO: 5.25 10E6/UL (ref 4.4–5.9)
SODIUM SERPL-SCNC: 139 MMOL/L (ref 135–145)
TRIGL SERPL-MCNC: 81 MG/DL
WBC # BLD AUTO: 7.4 10E3/UL (ref 4–11)

## 2024-04-03 PROCEDURE — 36415 COLL VENOUS BLD VENIPUNCTURE: CPT | Performed by: STUDENT IN AN ORGANIZED HEALTH CARE EDUCATION/TRAINING PROGRAM

## 2024-04-03 PROCEDURE — 99213 OFFICE O/P EST LOW 20 MIN: CPT | Mod: 25 | Performed by: STUDENT IN AN ORGANIZED HEALTH CARE EDUCATION/TRAINING PROGRAM

## 2024-04-03 PROCEDURE — 86803 HEPATITIS C AB TEST: CPT | Performed by: STUDENT IN AN ORGANIZED HEALTH CARE EDUCATION/TRAINING PROGRAM

## 2024-04-03 PROCEDURE — 87389 HIV-1 AG W/HIV-1&-2 AB AG IA: CPT | Performed by: STUDENT IN AN ORGANIZED HEALTH CARE EDUCATION/TRAINING PROGRAM

## 2024-04-03 PROCEDURE — 99386 PREV VISIT NEW AGE 40-64: CPT | Performed by: STUDENT IN AN ORGANIZED HEALTH CARE EDUCATION/TRAINING PROGRAM

## 2024-04-03 PROCEDURE — 85025 COMPLETE CBC W/AUTO DIFF WBC: CPT | Performed by: STUDENT IN AN ORGANIZED HEALTH CARE EDUCATION/TRAINING PROGRAM

## 2024-04-03 PROCEDURE — 83036 HEMOGLOBIN GLYCOSYLATED A1C: CPT | Performed by: STUDENT IN AN ORGANIZED HEALTH CARE EDUCATION/TRAINING PROGRAM

## 2024-04-03 PROCEDURE — 80061 LIPID PANEL: CPT | Performed by: STUDENT IN AN ORGANIZED HEALTH CARE EDUCATION/TRAINING PROGRAM

## 2024-04-03 PROCEDURE — 80053 COMPREHEN METABOLIC PANEL: CPT | Performed by: STUDENT IN AN ORGANIZED HEALTH CARE EDUCATION/TRAINING PROGRAM

## 2024-04-03 ASSESSMENT — PATIENT HEALTH QUESTIONNAIRE - PHQ9
SUM OF ALL RESPONSES TO PHQ QUESTIONS 1-9: 9
10. IF YOU CHECKED OFF ANY PROBLEMS, HOW DIFFICULT HAVE THESE PROBLEMS MADE IT FOR YOU TO DO YOUR WORK, TAKE CARE OF THINGS AT HOME, OR GET ALONG WITH OTHER PEOPLE: VERY DIFFICULT
SUM OF ALL RESPONSES TO PHQ QUESTIONS 1-9: 9

## 2024-04-03 ASSESSMENT — PAIN SCALES - GENERAL: PAINLEVEL: NO PAIN (0)

## 2024-04-03 NOTE — PROGRESS NOTES
"  Assessment & Plan     Encounter to establish care  - From the [facility], previously followed with [facility]  - Briefly established with [facility]  - Medical histories and medication list reviewed and updated  - Would like to discuss testosterone replacement therapy as below  - Healthcare maintenance reviewed as below    Healthcare maintenance  - BP: Normotensive today, no history of hypertensive disease  - BMI: Reviewed.  Discussed healthy diet and exercise recommendations.  Estimated body mass index is 29.67 kg/m  as calculated from the following:    Height as of this encounter: 1.905 m (6' 3\").    Weight as of this encounter: 107.7 kg (237 lb 6.4 oz).  - ASCVD risk screening: A1c/lipid screen today.  Discussed lifestyle management for risk mitigation as well as indications for ASA/statin therapy.   The 10-year ASCVD risk score (Travon GODWIN, et al., 2019) is: 1.2%    Values used to calculate the score:      Age: 38 years      Sex: Male      Is Non- : No      Diabetic: No      Tobacco smoker: No      Systolic Blood Pressure: 126 mmHg      Is BP treated: No      HDL Cholesterol: 44 mg/dL      Total Cholesterol: 194 mg/dL  - Mood: Stable.  Declines further need for further discussion.      4/3/2024    11:07 AM 4/1/2024     9:45 AM   PHQ-2 ( 1999 Pfizer)   Q1: Little interest or pleasure in doing things 2 2   Q2: Feeling down, depressed or hopeless 1 1   PHQ-2 Score 3 3   Q1: Little interest or pleasure in doing things More than half the days    Q2: Feeling down, depressed or hopeless Several days    PHQ-2 Score 3    - Tobacco/substance screening: No tobacco or illicit substance use.     Tobacco Use      Smoking status: Never      Smokeless tobacco: Never  - Infectious disease screening: Low risk but will screen as below  - Cancer screening:              -Family history: Mother with colon cancer age 58, father prostate cancer age 60s   -Lung: n/a   -Colon: Discussed starting colon " "cancer screening at age 45   -Prostate: Discussed screening starting at age 50  - Immunizations: Up-to-date aside from flu, COVID    Routine general medical examination at a health care facility  - CBC with platelets and differential  - Comprehensive metabolic panel (BMP + Alb, Alk Phos, ALT, AST, Total. Bili, TP)  - Hemoglobin A1c  - Lipid Profile (Chol, Trig, HDL, LDL calc)  - HIV Antigen Antibody Combo  - Hepatitis C Screen Reflex to HCV RNA Quant and Genotype  - Preventative screening guidelines provided in AVS  - Return in 1 year for annual physical    Hypogonadism male  Previously followed with [facility] endocrinology and was on testosterone replacement for hypergonadism, looks like last visit was 2017.  Saw provider in [place] in 2022 looking to restart testosterone replacement therapy although total testosterone low normal range at that time.  Reports a host of symptoms that he attributes to low testosterone and would like to resume hormone replacement therapy.  Briefly discussed risk/benefits of hormone replacement therapy, and will defer to urology for specialist recommendations and management.  - Adult Urology  Referral; Future    Screening for diabetes mellitus  - Hemoglobin A1c    Screening for hyperlipidemia  - Lipid Profile (Chol, Trig, HDL, LDL calc)    Screening examination for infectious disease  - HIV Antigen Antibody Combo  - Hepatitis C Screen Reflex to HCV RNA Quant and Genotype    I spent a total of 35 minutes on the day of the visit.   Time spent by me doing chart review, history and exam, documentation and further activities per the note      BMI  Estimated body mass index is 29.67 kg/m  as calculated from the following:    Height as of this encounter: 1.905 m (6' 3\").    Weight as of this encounter: 107.7 kg (237 lb 6.4 oz).   Weight management plan: Discussed healthy diet and exercise guidelines    Return in about 53 weeks (around 4/9/2025) for Annual Wellness " "Visit.     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[facility]  -Also does traveling consulting work of some kind  -Purchase some land up here and spends time developing his acreage  -Briefly established with [facility] but was disappointed that he could not get testosterone replacement  -History of low T and would like to get back on hormone replacement therapy    Hypogonadism  -Previously diagnosed in [facility]  -Was on hormone replacement therapy in the past  -Feels decreased energy, difficulty getting muscle tone, fatigue, clouded thinking when not on testosterone replacement    Review of Systems  Constitutional, HEENT, cardiovascular, pulmonary, gi and gu systems are negative, except as otherwise noted.      Objective    /78 (BP Location: Left arm, Patient Position: Sitting, Cuff Size: Adult Regular)   Pulse 72   Temp 97.6  F (36.4  C) (Temporal)   Resp 18   Ht 1.905 m (6' 3\")   Wt 107.7 kg (237 lb 6.4 oz)   SpO2 98%   BMI 29.67 kg/m    Body mass index is 29.67 kg/m .  Physical Exam   GENERAL: alert and no distress  EYES: Eyes grossly normal to inspection, PERRL and conjunctivae and sclerae normal  HENT: ear canals and TM's normal, nose and mouth without ulcers or lesions  NECK: no adenopathy, no asymmetry, masses, or scars  RESP: lungs clear to auscultation - no rales, rhonchi or wheezes  CV: regular rate and rhythm, normal S1 S2, no S3 or S4, no murmur, click or rub, no peripheral edema  ABDOMEN: soft, nontender, no hepatosplenomegaly, no masses and bowel sounds normal  MS: no gross musculoskeletal defects noted, no " edema  SKIN: no suspicious lesions or rashes  NEURO: Normal strength and tone, mentation intact and speech normal  PSYCH: mentation appears normal, affect normal/bright    Signed Electronically by: Bharat Barber MD    Answers submitted by the patient for this visit:  Patient Health Questionnaire (Submitted on 4/3/2024)  If you checked off any problems, how difficult have these problems made it for you to do your work, take care of things at home, or get along with other people?: Very difficult  General Questionnaire (Submitted on 4/3/2024)  Chief Complaint: Chronic problems general questions HPI Form  What is the reason for your visit today? : low t  How many servings of fruits and vegetables do you eat daily?: 2-3  On average, how many sweetened beverages do you drink each day (Examples: soda, juice, sweet tea, etc.  Do NOT count diet or artificially sweetened beverages)?: 0  How many minutes a day do you exercise enough to make your heart beat faster?: 10 to 19  How many days a week do you exercise enough to make your heart beat faster?: 3 or less  How many days per week do you miss taking your medication?: 0    Answers submitted by the patient for this visit:  Patient Health Questionnaire (Submitted on 4/3/2024)  If you checked off any problems, how difficult have these problems made it for you to do your work, take care of things at home, or get along with other people?: Very difficult  General Questionnaire (Submitted on 4/3/2024)  Chief Complaint: Chronic problems general questions HPI Form  What is the reason for your visit today? : low t  How many servings of fruits and vegetables do you eat daily?: 2-3  On average, how many sweetened beverages do you drink each day (Examples: soda, juice, sweet tea, etc.  Do NOT count diet or artificially sweetened beverages)?: 0  How many minutes a day do you exercise enough to make your heart beat faster?: 10 to 19  How many days a week do you exercise enough to make  your heart beat faster?: 3 or less  How many days per week do you miss taking your medication?: 0

## 2024-04-03 NOTE — PATIENT INSTRUCTIONS
Preventive Care Advice   This is general advice given by our system to help you stay healthy. However, your care team may have specific advice just for you. Please talk to your care team about your preventive care needs.  Nutrition  Eat 5 or more servings of fruits and vegetables each day.  Try wheat bread, brown rice and whole grain pasta (instead of white bread, rice, and pasta).  Get enough calcium and vitamin D. Check the label on foods and aim for 100% of the RDA (recommended daily allowance).  Lifestyle  Exercise at least 150 minutes each week   (30 minutes a day, 5 days a week).  Do muscle strengthening activities 2 days a week. These help control your weight and prevent disease.  No smoking.  Wear sunscreen to prevent skin cancer.  Have a dental exam and cleaning every 6 months.  Yearly exams  See your health care team every year to talk about:  Any changes in your health.  Any medicines your care team has prescribed.  Preventive care, family planning, and ways to prevent chronic diseases.  Shots (vaccines)   HPV shots (up to age 26), if you've never had them before.  Hepatitis B shots (up to age 59), if you've never had them before.  COVID-19 shot: Get this shot when it's due.  Flu shot: Get a flu shot every year.  Tetanus shot: Get a tetanus shot every 10 years.  Pneumococcal, hepatitis A, and RSV shots: Ask your care team if you need these based on your risk.  Shingles shot (for age 50 and up).  General health tests  Diabetes screening:  Starting at age 35, Get screened for diabetes at least every 3 years.  If you are younger than age 35, ask your care team if you should be screened for diabetes.  Cholesterol test: At age 39, start having a cholesterol test every 5 years, or more often if advised.  Bone density scan (DEXA): At age 50, ask your care team if you should have this scan for osteoporosis (brittle bones).  Hepatitis C: Get tested at least once in your life.  STIs (sexually transmitted  infections)  Before age 24: Ask your care team if you should be screened for STIs.  After age 24: Get screened for STIs if you're at risk. You are at risk for STIs (including HIV) if:  You are sexually active with more than one person.  You don't use condoms every time.  You or a partner was diagnosed with a sexually transmitted infection.  If you are at risk for HIV, ask about PrEP medicine to prevent HIV.  Get tested for HIV at least once in your life, whether you are at risk for HIV or not.  Cancer screening tests  Cervical cancer screening: If you have a cervix, begin getting regular cervical cancer screening tests at age 21. Most people who have regular screenings with normal results can stop after age 65. Talk about this with your provider.  Breast cancer scan (mammogram): If you've ever had breasts, begin having regular mammograms starting at age 40. This is a scan to check for breast cancer.  Colon cancer screening: It is important to start screening for colon cancer at age 45.  Have a colonoscopy test every 10 years (or more often if you're at risk) Or, ask your provider about stool tests like a FIT test every year or Cologuard test every 3 years.  To learn more about your testing options, visit: https://www.ImageProtect/522061.pdf.  For help making a decision, visit: https://bit.ly/ea28229.  Prostate cancer screening test: If you have a prostate and are age 55 to 69, ask your provider if you would benefit from a yearly prostate cancer screening test.  Lung cancer screening: If you are a current or former smoker age 50 to 80, ask your care team if ongoing lung cancer screenings are right for you.  For informational purposes only. Not to replace the advice of your health care provider. Copyright   2023 [employer]. All rights reserved. Clinically reviewed by the [facility] Transitions Program. [employer] 682606 - REV 01/24.    Learning About Depression Screening  What is depression  screening?  Depression screening is a way to see if you have depression symptoms. It may be done by a doctor or counselor. It's often part of a routine checkup. That's because your mental health is just as important as your physical health.  Depression is a mental health condition that affects how you feel, think, and act. You may:  Have less energy.  Lose interest in your daily activities.  Feel sad and grouchy for a long time.  Depression is very common. It affects people of all ages.  Many things can lead to depression. Some people become depressed after they have a stroke or find out they have a major illness like cancer or heart disease. The death of a loved one or a breakup may lead to depression. It can run in families. Most experts believe that a combination of inherited genes and stressful life events can cause it.  What happens during screening?  You may be asked to fill out a form about your depression symptoms. You and the doctor will discuss your answers. The doctor may ask you more questions to learn more about how you think, act, and feel.  What happens after screening?  If you have symptoms of depression, your doctor will talk to you about your options.  Doctors usually treat depression with medicines or counseling. Often, combining the two works best. Many people don't get help because they think that they'll get over the depression on their own. But people with depression may not get better unless they get treatment.  The cause of depression is not well understood. There may be many factors involved. But if you have depression, it's not your fault.  A serious symptom of depression is thinking about death or suicide. If you or someone you care about talks about this or about feeling hopeless, get help right away.  It's important to know that depression can be treated. Medicine, counseling, and self-care may help.  Where can you learn more?  Go to https://www.healthwise.net/patiented  Enter T185 in  "the search box to learn more about \"Learning About Depression Screening.\"  Current as of: June 24, 2023               Content Version: 14.0    4096-9043 [employer].   Care instructions adapted under license by your healthcare professional. If you have questions about a medical condition or this instruction, always ask your healthcare professional. [employer] disclaims any warranty or liability for your use of this information.      "

## 2024-04-03 NOTE — COMMUNITY RESOURCES LIST (ENGLISH)
April 3, 2024           YOUR PERSONALIZED LIST OF SERVICES & PROGRAMS           & SHELTER    Case Management      [employer] - Housing search assistance  [address] [place] [address] (Distance: 11.0 miles)  Language: English  Fee: Free      [employer] - Housing Stabilization Services  Phone: (738) 505-2987  Website: https://homebasemn.com/  Language: English  Hours: Mon 8:00 AM - 4:00 PM Tue 8:00 AM - 4:00 PM Wed 8:00 AM - 4:00 PM Thu 8:00 AM - 4:00 PM Fri 8:00 AM - 4:00 PM  Fee: Free  Accessibility: Blind accommodation, Deaf or hard of hearing  Transportation Options: Free transportation    Payment Assistance      [facility] - Bridges Program  [address] [place] [address] (Distance: 19.9 miles)  Phone: (287) 348-7805  Website: http://wwwChinese Radio [place]/  Language: English  Fee: Sliding scale  Accessibility: Ada accessible      [employer] - [employer] the [employer]  Phone: (602) 544-2432  Website: https://www.bet41-jdkpiymbipwkzl.org/programs.html  Language: English  Hours: Mon 7:00 AM - 7:00 PM Tue 7:00 AM - 7:00 PM Wed 7:00 AM - 7:00 PM Thu 7:00 AM - 7:00 PM Fri 7:00 AM - 7:00 PM  Fee: Free    Mediation & Eviction Prevention      Line - Tenant Rights / Eviction Prevention  Website: https://Sepior.eMarketer/z-obyk-xw-/  Language: English, Thai               IMPORTANT NUMBERS & WEBSITES        Emergency Services  911  .   [employer] Way  211 http://211unitedway.org  .   Poison Control  (128) 602-4152 http://[place]poison.org http://wisconsinpoison.org  .     Suicide and Crisis Lifeline  988 http://988lifeline.org  .   Childhelp National Child Abuse Hotline  721.769.8749 http://Childhelphotline.org   .   National Sexual Assault Hotline  (835) 292-5522 (HOPE) http://Rainn.org   .     National Runaway Safeline  (454) 787-7448 (RUNAWAY) http://[address] [place] [address].org  .   Pregnancy & Postpartum Support  Call/text  185-720-1347  [place]: http://ppsupportmn.org  [place]: http://AnyMeeting.com/wi  .   Substance Abuse National Helpline ([facility])  800622-HELP (0070) http://Findtreatment.gov   .                DISCLAIMER: These resources have been generated via the MyDoc Platform. [employer] does not endorse any service providers mentioned in this resource list. [employer] does not guarantee that the services mentioned in this resource list will be available to you or will improve your health or wellness.    [facility]

## 2024-04-04 ASSESSMENT — PATIENT HEALTH QUESTIONNAIRE - PHQ9: SUM OF ALL RESPONSES TO PHQ QUESTIONS 1-9: 9

## 2024-04-05 LAB
HCV AB SERPL QL IA: NONREACTIVE
HIV 1+2 AB+HIV1 P24 AG SERPL QL IA: NONREACTIVE

## 2024-04-26 ENCOUNTER — TELEPHONE (OUTPATIENT)
Dept: FAMILY MEDICINE | Facility: OTHER | Age: 39
End: 2024-04-26

## 2024-04-26 DIAGNOSIS — E29.1 HYPOGONADISM MALE: Primary | ICD-10-CM

## 2024-04-26 NOTE — TELEPHONE ENCOUNTER
April 26, 2024    [facility] called in regards of referral placed by Dr Barber 4/4/24. [facility] requesting updated lab for testosterone before they are able to schedule patient. Please advise    -Naomi Pastor on 4/26/2024 at 1:40 PM

## 2024-04-29 NOTE — TELEPHONE ENCOUNTER
Okay to have him come in for lab only appointment.  Does have to be first thing in the morning as soon as lab opens.  Orders placed. Thanks.

## 2024-04-30 NOTE — TELEPHONE ENCOUNTER
Received call back from patient and explained to him that a lab test is required to move forward with appointment. He explained that he has withheld his Testerone for 6 weeks and has not heard back from anyone and restarted it because he felt horrible. Now the referral is stating that they no longer take testosterone patients, and patient is requesting a call back from provider to explain next steps.

## 2024-05-03 NOTE — TELEPHONE ENCOUNTER
Returned call to patient.  Did leave message on personalized voicemail.  Explained that urology does take testosterone replacement referrals but require updated testosterone lab levels.  Order is placed and he can come in for any a.m. lab only appointment.  If the testosterone level is in normal range then I am not sure about urology's policy regarding taking the referral.

## 2024-05-09 ENCOUNTER — TELEPHONE (OUTPATIENT)
Dept: FAMILY MEDICINE | Facility: OTHER | Age: 39
End: 2024-05-09

## 2024-05-09 NOTE — TELEPHONE ENCOUNTER
1:40 PM    Reason for Call: Phone Call    Description: pt called about referral for Urology. Pt was called but the  was rude when pt asked for a Male Urologist and hung up on pt when he only wanted a Male urologist.  Pt is wondering is there anywhere else he could go for a male Urologist possible [place]    Was an appointment offered for this call? No  If yes : Appointment type              Date    Preferred method for responding to this message: Telephone Call  What is your phone number ? [phone]     If we cannot reach you directly, may we leave a detailed response at the number you provided? Yes    Can this message wait until your PCP/provider returns, if available today? aDnita Hernández

## 2024-05-09 NOTE — TELEPHONE ENCOUNTER
Call placed to [facility], they do have a male provider and are willing to schedule patient. Patient can call.   Cannot be seen at [facility] as per previous notes, they do not see patients with low testosterone.   Call out to patient to update him on [facility] having a male provider he can schedule with and to provide him with the number. Also informed patient he cannot be seen at [facility] for this.   Patient very upset and continued to cut this RN CC off from speaking. Reports that he has been given the run around on this for the last 10 years and he should not have to fight so hard. He was under the impression that his testosterone was drawn when he had labs in April. States he has started taking his testosterone again and if he comes in for lab work, his lab results will not be accurate.   He is wondering what the next steps should be. Should he stop his testosterone and wait a period of time before making his urology appointment or getting labs drawn.   Will route to PCP to advise.     John KIM RN, Care Coordinator  [facility]

## 2024-05-14 NOTE — TELEPHONE ENCOUNTER
Attempted to call again to update patient. Left second voicemail requesting a call back to clinic to relay information from PCP.

## 2024-11-05 ASSESSMENT — PATIENT HEALTH QUESTIONNAIRE - PHQ9: SUM OF ALL RESPONSES TO PHQ QUESTIONS 1-9: 9

## 2025-05-17 ENCOUNTER — HEALTH MAINTENANCE LETTER (OUTPATIENT)
Age: 40
End: 2025-05-17